# Patient Record
Sex: MALE | Race: WHITE | Employment: UNEMPLOYED | URBAN - METROPOLITAN AREA
[De-identification: names, ages, dates, MRNs, and addresses within clinical notes are randomized per-mention and may not be internally consistent; named-entity substitution may affect disease eponyms.]

---

## 2017-05-18 ENCOUNTER — APPOINTMENT (OUTPATIENT)
Dept: GENERAL RADIOLOGY | Age: 37
End: 2017-05-18
Payer: MEDICAID

## 2017-05-18 ENCOUNTER — APPOINTMENT (OUTPATIENT)
Dept: CT IMAGING | Age: 37
End: 2017-05-18
Payer: MEDICAID

## 2017-05-18 ENCOUNTER — HOSPITAL ENCOUNTER (EMERGENCY)
Age: 37
Discharge: HOME | End: 2017-05-18
Attending: EMERGENCY MEDICINE
Payer: MEDICAID

## 2017-05-18 VITALS
DIASTOLIC BLOOD PRESSURE: 87 MMHG | SYSTOLIC BLOOD PRESSURE: 149 MMHG | RESPIRATION RATE: 18 BRPM | HEART RATE: 108 BPM | TEMPERATURE: 98.5 F | BODY MASS INDEX: 26.36 KG/M2 | WEIGHT: 178 LBS | HEIGHT: 69 IN | OXYGEN SATURATION: 96 %

## 2017-05-18 DIAGNOSIS — M77.9 BONE SPUR: ICD-10-CM

## 2017-05-18 DIAGNOSIS — S00.83XA CONTUSION OF OTHER PART OF HEAD, INITIAL ENCOUNTER: Primary | ICD-10-CM

## 2017-05-18 PROCEDURE — 70450 CT HEAD/BRAIN W/O DYE: CPT

## 2017-05-18 PROCEDURE — 99284 EMERGENCY DEPT VISIT MOD MDM: CPT

## 2017-05-18 PROCEDURE — 73630 X-RAY EXAM OF FOOT: CPT

## 2017-05-18 RX ORDER — IBUPROFEN 600 MG/1
600 TABLET ORAL EVERY 8 HOURS PRN
Qty: 21 TABLET | Refills: 0 | Status: SHIPPED | OUTPATIENT
Start: 2017-05-18 | End: 2017-10-26 | Stop reason: ALTCHOICE

## 2017-05-18 ASSESSMENT — PAIN SCALES - GENERAL: PAINLEVEL_OUTOF10: 6

## 2017-05-18 ASSESSMENT — PAIN DESCRIPTION - ORIENTATION: ORIENTATION: LEFT

## 2017-05-18 ASSESSMENT — ENCOUNTER SYMPTOMS
RESPIRATORY NEGATIVE: 1
EYES NEGATIVE: 1
ALLERGIC/IMMUNOLOGIC NEGATIVE: 1

## 2017-05-18 ASSESSMENT — PAIN DESCRIPTION - DESCRIPTORS: DESCRIPTORS: ACHING;SHOOTING;SHARP

## 2017-05-18 ASSESSMENT — PAIN DESCRIPTION - LOCATION: LOCATION: LEG;HEAD

## 2017-05-18 ASSESSMENT — PAIN DESCRIPTION - FREQUENCY: FREQUENCY: CONTINUOUS

## 2017-09-28 ENCOUNTER — APPOINTMENT (OUTPATIENT)
Dept: GENERAL RADIOLOGY | Age: 37
DRG: 383 | End: 2017-09-28
Payer: MEDICARE

## 2017-09-28 ENCOUNTER — HOSPITAL ENCOUNTER (INPATIENT)
Age: 37
LOS: 2 days | Discharge: HOME | DRG: 383 | End: 2017-09-30
Attending: EMERGENCY MEDICINE | Admitting: PODIATRIST
Payer: MEDICARE

## 2017-09-28 DIAGNOSIS — L02.419 ABSCESS OF ANKLE: Primary | ICD-10-CM

## 2017-09-28 LAB
ABSOLUTE EOS #: 0.1 K/UL (ref 0–0.4)
ABSOLUTE LYMPH #: 2 K/UL (ref 1–4.8)
ABSOLUTE MONO #: 0.9 K/UL (ref 0.2–0.8)
ANION GAP SERPL CALCULATED.3IONS-SCNC: 14 MMOL/L (ref 9–17)
BASOPHILS # BLD: 0 %
BASOPHILS ABSOLUTE: 0 K/UL (ref 0–0.2)
BUN BLDV-MCNC: 6 MG/DL (ref 6–20)
BUN/CREAT BLD: 8 (ref 9–20)
C-REACTIVE PROTEIN: 32.5 MG/L (ref 0–5)
CALCIUM SERPL-MCNC: 8.7 MG/DL (ref 8.6–10.4)
CHLORIDE BLD-SCNC: 101 MMOL/L (ref 98–107)
CO2: 25 MMOL/L (ref 20–31)
CREAT SERPL-MCNC: 0.73 MG/DL (ref 0.7–1.2)
DIFFERENTIAL TYPE: ABNORMAL
EOSINOPHILS RELATIVE PERCENT: 1 %
GFR AFRICAN AMERICAN: >60 ML/MIN
GFR NON-AFRICAN AMERICAN: >60 ML/MIN
GFR SERPL CREATININE-BSD FRML MDRD: ABNORMAL ML/MIN/{1.73_M2}
GFR SERPL CREATININE-BSD FRML MDRD: ABNORMAL ML/MIN/{1.73_M2}
GLUCOSE BLD-MCNC: 100 MG/DL (ref 70–99)
HCT VFR BLD CALC: 45.4 % (ref 41–53)
HEMOGLOBIN: 15 G/DL (ref 13.5–17.5)
LYMPHOCYTES # BLD: 15 %
MCH RBC QN AUTO: 30.9 PG (ref 26–34)
MCHC RBC AUTO-ENTMCNC: 33.1 G/DL (ref 31–37)
MCV RBC AUTO: 93.5 FL (ref 80–100)
MONOCYTES # BLD: 7 %
PDW BLD-RTO: 13.2 % (ref 11.5–14.5)
PLATELET # BLD: 278 K/UL (ref 130–400)
PLATELET ESTIMATE: ABNORMAL
PMV BLD AUTO: 8 FL (ref 6–12)
POTASSIUM SERPL-SCNC: 3.8 MMOL/L (ref 3.7–5.3)
RBC # BLD: 4.86 M/UL (ref 4.5–5.9)
RBC # BLD: ABNORMAL 10*6/UL
SEDIMENTATION RATE, ERYTHROCYTE: 4 MM (ref 0–15)
SEG NEUTROPHILS: 77 %
SEGMENTED NEUTROPHILS ABSOLUTE COUNT: 10.3 K/UL (ref 1.8–7.7)
SODIUM BLD-SCNC: 140 MMOL/L (ref 135–144)
WBC # BLD: 13.3 K/UL (ref 3.5–11)
WBC # BLD: ABNORMAL 10*3/UL

## 2017-09-28 PROCEDURE — 6370000000 HC RX 637 (ALT 250 FOR IP): Performed by: PODIATRIST

## 2017-09-28 PROCEDURE — 99284 EMERGENCY DEPT VISIT MOD MDM: CPT

## 2017-09-28 PROCEDURE — 86140 C-REACTIVE PROTEIN: CPT

## 2017-09-28 PROCEDURE — 87075 CULTR BACTERIA EXCEPT BLOOD: CPT

## 2017-09-28 PROCEDURE — 85025 COMPLETE CBC W/AUTO DIFF WBC: CPT

## 2017-09-28 PROCEDURE — 0H9LXZX DRAINAGE OF LEFT LOWER LEG SKIN, EXTERNAL APPROACH, DIAGNOSTIC: ICD-10-PCS | Performed by: PODIATRIST

## 2017-09-28 PROCEDURE — 73610 X-RAY EXAM OF ANKLE: CPT

## 2017-09-28 PROCEDURE — 87186 SC STD MICRODIL/AGAR DIL: CPT

## 2017-09-28 PROCEDURE — 87070 CULTURE OTHR SPECIMN AEROBIC: CPT

## 2017-09-28 PROCEDURE — 86403 PARTICLE AGGLUT ANTBDY SCRN: CPT

## 2017-09-28 PROCEDURE — 85651 RBC SED RATE NONAUTOMATED: CPT

## 2017-09-28 PROCEDURE — 6370000000 HC RX 637 (ALT 250 FOR IP): Performed by: NURSE PRACTITIONER

## 2017-09-28 PROCEDURE — 2500000003 HC RX 250 WO HCPCS

## 2017-09-28 PROCEDURE — 80048 BASIC METABOLIC PNL TOTAL CA: CPT

## 2017-09-28 PROCEDURE — 87205 SMEAR GRAM STAIN: CPT

## 2017-09-28 PROCEDURE — 1200000000 HC SEMI PRIVATE

## 2017-09-28 PROCEDURE — 2500000003 HC RX 250 WO HCPCS: Performed by: PODIATRIST

## 2017-09-28 PROCEDURE — 6360000002 HC RX W HCPCS: Performed by: PODIATRIST

## 2017-09-28 RX ORDER — MORPHINE SULFATE 2 MG/ML
1 INJECTION, SOLUTION INTRAMUSCULAR; INTRAVENOUS
Status: DISCONTINUED | OUTPATIENT
Start: 2017-09-28 | End: 2017-09-30 | Stop reason: HOSPADM

## 2017-09-28 RX ORDER — SODIUM CHLORIDE 0.9 % (FLUSH) 0.9 %
10 SYRINGE (ML) INJECTION PRN
Status: DISCONTINUED | OUTPATIENT
Start: 2017-09-28 | End: 2017-09-30 | Stop reason: HOSPADM

## 2017-09-28 RX ORDER — MORPHINE SULFATE 2 MG/ML
2 INJECTION, SOLUTION INTRAMUSCULAR; INTRAVENOUS ONCE
Status: COMPLETED | OUTPATIENT
Start: 2017-09-28 | End: 2017-09-28

## 2017-09-28 RX ORDER — HYDROCODONE BITARTRATE AND ACETAMINOPHEN 5; 325 MG/1; MG/1
1 TABLET ORAL ONCE
Status: COMPLETED | OUTPATIENT
Start: 2017-09-28 | End: 2017-09-28

## 2017-09-28 RX ORDER — LIDOCAINE HYDROCHLORIDE 10 MG/ML
INJECTION, SOLUTION INFILTRATION; PERINEURAL
Status: COMPLETED
Start: 2017-09-28 | End: 2017-09-28

## 2017-09-28 RX ORDER — HYDROCODONE BITARTRATE AND ACETAMINOPHEN 5; 325 MG/1; MG/1
2 TABLET ORAL EVERY 4 HOURS PRN
Status: DISCONTINUED | OUTPATIENT
Start: 2017-09-28 | End: 2017-09-30 | Stop reason: HOSPADM

## 2017-09-28 RX ORDER — ACETAMINOPHEN 325 MG/1
650 TABLET ORAL EVERY 4 HOURS PRN
Status: DISCONTINUED | OUTPATIENT
Start: 2017-09-28 | End: 2017-09-30 | Stop reason: HOSPADM

## 2017-09-28 RX ORDER — LIDOCAINE HYDROCHLORIDE 10 MG/ML
20 INJECTION, SOLUTION INFILTRATION; PERINEURAL ONCE
Status: COMPLETED | OUTPATIENT
Start: 2017-09-28 | End: 2017-09-28

## 2017-09-28 RX ORDER — HYDROCODONE BITARTRATE AND ACETAMINOPHEN 5; 325 MG/1; MG/1
1 TABLET ORAL EVERY 4 HOURS PRN
Status: DISCONTINUED | OUTPATIENT
Start: 2017-09-28 | End: 2017-09-30 | Stop reason: HOSPADM

## 2017-09-28 RX ORDER — MORPHINE SULFATE 2 MG/ML
2 INJECTION, SOLUTION INTRAMUSCULAR; INTRAVENOUS
Status: DISCONTINUED | OUTPATIENT
Start: 2017-09-28 | End: 2017-09-30 | Stop reason: HOSPADM

## 2017-09-28 RX ORDER — ALBUTEROL SULFATE 90 UG/1
2 AEROSOL, METERED RESPIRATORY (INHALATION) EVERY 6 HOURS PRN
Status: DISCONTINUED | OUTPATIENT
Start: 2017-09-28 | End: 2017-09-30 | Stop reason: HOSPADM

## 2017-09-28 RX ORDER — SODIUM CHLORIDE 0.9 % (FLUSH) 0.9 %
10 SYRINGE (ML) INJECTION EVERY 12 HOURS SCHEDULED
Status: DISCONTINUED | OUTPATIENT
Start: 2017-09-28 | End: 2017-09-30 | Stop reason: HOSPADM

## 2017-09-28 RX ADMIN — MORPHINE SULFATE 2 MG: 2 INJECTION, SOLUTION INTRAMUSCULAR; INTRAVENOUS at 16:30

## 2017-09-28 RX ADMIN — TAZOBACTAM SODIUM AND PIPERACILLIN SODIUM 3.38 G: 375; 3 INJECTION, SOLUTION INTRAVENOUS at 20:38

## 2017-09-28 RX ADMIN — HYDROCODONE BITARTRATE AND ACETAMINOPHEN 1 TABLET: 5; 325 TABLET ORAL at 13:04

## 2017-09-28 RX ADMIN — HYDROCODONE BITARTRATE AND ACETAMINOPHEN 2 TABLET: 5; 325 TABLET ORAL at 21:05

## 2017-09-28 RX ADMIN — LIDOCAINE HYDROCHLORIDE 20 ML: 10 INJECTION, SOLUTION INFILTRATION; PERINEURAL at 15:16

## 2017-09-28 ASSESSMENT — PAIN SCALES - GENERAL
PAINLEVEL_OUTOF10: 10
PAINLEVEL_OUTOF10: 7
PAINLEVEL_OUTOF10: 4
PAINLEVEL_OUTOF10: 5
PAINLEVEL_OUTOF10: 7
PAINLEVEL_OUTOF10: 7
PAINLEVEL_OUTOF10: 4
PAINLEVEL_OUTOF10: 8

## 2017-09-28 ASSESSMENT — PAIN DESCRIPTION - FREQUENCY: FREQUENCY: CONTINUOUS

## 2017-09-28 ASSESSMENT — PAIN DESCRIPTION - ORIENTATION
ORIENTATION: LEFT
ORIENTATION: LEFT

## 2017-09-28 ASSESSMENT — PAIN DESCRIPTION - DESCRIPTORS
DESCRIPTORS: CONSTANT;THROBBING;SHARP
DESCRIPTORS: CONSTANT;THROBBING;SHARP

## 2017-09-28 ASSESSMENT — PAIN DESCRIPTION - LOCATION
LOCATION: ANKLE
LOCATION: ANKLE

## 2017-09-28 NOTE — Clinical Note
Patient Class: Inpatient [101]   REQUIRED: Diagnosis: Abscess of ankle [326002]   Estimated Length of Stay: Estimated stay of more than 2 midnights   Future Attending Provider: Kel Valles [[id number]]

## 2017-09-28 NOTE — ED PROVIDER NOTES
[address] [facility]  eMERGENCY dEPARTMENT eNCOUnter      Pt Name: Pina Duran  MRN: [id number]  [place] 1980  Date of evaluation: 9/28/2017  Provider: Mega Oliva NP    CHIEF COMPLAINT       Chief Complaint   Patient presents with    Abscess     left ankle, onset Last Monday, hit stones while mowing grass         HISTORY OF PRESENT ILLNESS  (Location/Symptom, Timing/Onset, Context/Setting, Quality, Duration, Modifying Factors, Severity.)   Pina Duran is a 40 y.o. male who presents to the emergency department  With complaints of left medial ankle pain. He states that 2 days ago he was using his mower and ran over a rock. The rocks up in caught him in the medial aspect of the left ankle. This produced some redness and swelling. He stayed at home it was applying ice. He awoke this morning he found a very red and swollen area that had become and more painful. He denies any fever or chills. Weightbearing increases the pain. He states he has taken nothing for the discomfort today. a friend brought him into the hospital today. Nursing Notes were reviewed. ALLERGIES     Review of patient's allergies indicates no known allergies.     CURRENT MEDICATIONS       Discharge Medication List as of 9/30/2017  4:39 PM      CONTINUE these medications which have NOT CHANGED    Details   ibuprofen (ADVIL;MOTRIN) 600 MG tablet Take 1 tablet by mouth every 8 hours as needed for Pain, Disp-21 tablet, R-0Print      albuterol (PROAIR HFA) 108 (90 BASE) MCG/ACT inhaler Inhale 2 puffs into the lungs every 6 hours as needed for Wheezing., Disp-1 Inhaler, R-0             PAST MEDICAL HISTORY         Diagnosis Date    Anxiety     epilepsy     Headache     hepatitis c     PTSD (post-traumatic stress disorder)     Transverse myelitis ([facility] [address]        SURGICAL HISTORY           Procedure Laterality Date    ARM SURGERY Right     DENTAL SURGERY      EYE SURGERY           FAMILY HISTORY           Problem Relation Age of Onset    Cancer Maternal Grandmother      Family Status   Relation Status    Maternal Grandmother         SOCIAL HISTORY      reports that he has been smoking Cigarettes. He has been smoking about 0.50 packs per day. He has never used smokeless tobacco. He reports that he drinks alcohol. He reports that he does not use illicit drugs. REVIEW OF SYSTEMS    (2-9 systems for level 4, 10 or more for level 5)   REVIEW OF SYSTEMS    Constitutional:  Denies fever,  complains of chills  Eyes:  Denies vision changes  HENT:  Denies sore throat or neck pain   Respiratory:  Denies cough or shortness of breath   Cardiovascular:  Denies chest pain  GI:  Denies abdominal pain, nausea, vomiting, or diarrhea   Musculoskeletal:  Complains of left ankle pain. Denies back pain  Skin:  Complains of erythema, swelling and abscessed left medial ankle      All systems negative except as marked. Except as noted above the remainder of the review of systems was reviewed and negative. PHYSICAL EXAM    (up to 7 for level 4, 8 or more for level 5)   ED Triage Vitals   BP Temp Temp Source Pulse Resp SpO2 Height Weight   09/28/17 1221 09/28/17 1221 09/28/17 1221 09/28/17 1221 09/28/17 1221 09/28/17 1221 09/28/17 1221 09/28/17 1221   121/60 99.4 °F (37.4 °C) Oral 99 16 98 % 5' 9\" (1.753 m) 179 lb 3.2 oz (81.3 kg)     General Appearance:  Alert, cooperative, no distress, appears stated age. Head:  Normocephalic, without obvious abnormality, atraumatic. Eyes:  conjunctiva/corneas clear, EOM's intact. Sclera anicteric. ENT: Mucous membranes moist.    Neck: Supple, symmetrical, trachea midline, no adenopathy. Lungs:   Respirations eupneic. Lungs CTA   Chest Wall:  Nontender   Heart:   Abdomen:     Extremities:              Pulses:   Skin:   Neurologic:    RRR  Soft, nontender    Erythema with swelling noted along the medial aspect left ankle.   There is a small abscess with mild fluctuance in the center of the erythema. The erythema extends outward nearly circumferential around the foot . the area is very tender to palpation and mildly warm. There is no drainage noted. Radial/ulnar pulses 3+    No rashes or lesions to exposed skin. Alert and oriented X 3. Motor grossly normal.  Speech clear. DIAGNOSTIC RESULTS       RADIOLOGY:   Non-plain film images such as CT, Ultrasound and MRI are read by the radiologist. Plain radiographic images are visualized and preliminarily interpreted by the emergency physician with the below findings:  Interpretation per the Radiologist below, if available at the time of this note:    MRI ANKLE LEFT [address]   Final Result   Small open wound within the soft tissues overlying the medial malleolus. Extensive surrounding soft tissue edema and enhancement, compatible with   cellulitis. No drainable fluid collection to suggest abscess formation. No   convincing evidence for underlying osteomyelitis. XR ANKLE LEFT (MIN 3 VIEWS)   Final Result   Medial ankle soft tissue swelling without acute fracture.                  LABS:  Labs Reviewed   ANAEROBIC AND AEROBIC CULTURE - Abnormal; Notable for the following:        Result Value    Direct Exam RARE GRAM POSITIVE COCCI SINGLY (*)     Culture   (*)     Value: METHICILLIN RESISTANT STAPHYLOCOCCUS AUREUS LIGHT GROWTH    Culture NO ANAEROBIC ORGANISMS ISOLATED AT 2 DAYS (*)     All other components within normal limits   C-REACTIVE PROTEIN - Abnormal; Notable for the following:     CRP 32.5 (*)     All other components within normal limits   CBC WITH AUTO DIFFERENTIAL - Abnormal; Notable for the following:     WBC 13.3 (*)     Segs Absolute 10.30 (*)     Absolute Mono # 0.90 (*)     All other components within normal limits   BASIC METABOLIC PANEL - Abnormal; Notable for the following:     Glucose 100 (*)     Bun/Cre Ratio 8 (*)     All other components within normal limits   BASIC METABOLIC PANEL - Abnormal; Notable for the following:     Glucose 112 (*)     Bun/Cre Ratio 7 (*)     All other components within normal limits   CBC WITH AUTO DIFFERENTIAL - Abnormal; Notable for the following:     WBC 13.5 (*)     Segs Absolute 9.50 (*)     Absolute Mono # 1.20 (*)     All other components within normal limits   VANCOMYCIN, TROUGH - Abnormal; Notable for the following:     Vancomycin Tr 8.0 (*)     All other components within normal limits   SEDIMENTATION RATE       All other labs were within normal range or not returned as of this dictation. EMERGENCY DEPARTMENT COURSE and DIFFERENTIAL DIAGNOSIS/MDM:   Vitals:    Vitals:    17 0126 17 0512 17 0809 17 1213   BP: 108/63 106/69 118/68 120/71   Pulse: 65 73 68 71   Resp:  18 20 20   Temp:  97.5 °F (36.4 °C) 98.1 °F (36.7 °C) 98 °F (36.7 °C)   TempSrc:  Oral Oral Oral   SpO2:  93% 97% 97%   Weight:       Height:           Medical Decision Makin-year-old male with a large abscessed area of the left medial ankle there is also cellulitic. X-ray was negative for any bony injury. Lab review: mild leukocytosis is appreciated. Podiatry was consulted and evaluated the patient in the emergency department. .  The patient be admitted to the hospital for further care and intervention. CONSULTS:  IP CONSULT TO HOSPITALIST  IP CONSULT TO SOCIAL WORK  IP CONSULT TO PHARMACY  IP CONSULT TO INFECTIOUS DISEASES    PROCEDURES:  None    FINAL IMPRESSION      1.  Abscess of ankle          DISPOSITION/PLAN   DISPOSITION Decision to Admit    PATIENT REFERRED TO:   Bao Steven, [address]  [phone]    In 1 week  and find new pcp and follow up      DISCHARGE MEDICATIONS:     Discharge Medication List as of 2017  4:39 PM      START taking these medications    Details   sulfamethoxazole-trimethoprim (BACTRIM DS) 800-160 MG per tablet Take 1 tablet by mouth 2 times daily for 10 days, Disp-20 tablet, R-0Print HYDROcodone-acetaminophen (NORCO) 5-325 MG per tablet Take 1 tablet by mouth every 6 hours as needed for Pain ., Disp-20 tablet, R-0Print                 (Please note that portions of this note were completed with a voice recognition program.  Efforts were made to edit the dictations but occasionally words are mis-transcribed.)         Cynthia Poe NP  10/01/17 3466

## 2017-09-28 NOTE — H&P
Podiatry H&P      HPI:     Catarino Hu is a 40 y.o. male who presents to the hospital complaining of left ankle pain. Patient states on Monday of this week he was mowing the lawn and a piece of debris flew off the mower and hit his left ankle. He states yesterday the ankle started to form an abscess and become pain. This prompted him to come to the ER today. He reports a history of IVDA, Hep C, bipolar, but states he has not seen a doctor and is off of medication. Denies n/v/f/c. Past Medical History   has a past medical history of Anxiety; Headache; PTSD (post-traumatic stress disorder); and Transverse myelitis ([facility] [address] Past Surgical History   has a past surgical history that includes eye surgery; Dental surgery; and Arm Surgery (Right). Medications  Prior to Admission medications    Medication Sig Start Date End Date Taking? Authorizing Provider   ibuprofen (ADVIL;MOTRIN) 600 MG tablet Take 1 tablet by mouth every 8 hours as needed for Pain 5/18/17   Thao Fernandez MD   albuterol (PROAIR HFA) 108 (90 BASE) MCG/ACT inhaler Inhale 2 puffs into the lungs every 6 hours as needed for Wheezing. 3/1/15   Loretta Wu MD    Scheduled Meds:   lidocaine  20 mL Intradermal Once    lidocaine         Continuous Infusions:   PRN Meds:. Allergies  has No Known Allergies. Family History  family history is not on file. Social History   reports that he has been smoking Cigarettes. He has been smoking about 0.50 packs per day. He has never used smokeless tobacco.   reports that he drinks alcohol. reports that he does not use illicit drugs. Review of Systems  All systems reviewed as negative unless otherwise stated in HPI    PHYSICAL EXAM:     Vitals:   Vitals:    09/28/17 1221   BP: 121/60   Pulse: 99   Resp: 16   Temp: 99.4 °F (37.4 °C)   SpO2: 98%     General: AAO x 3 in NAD.   Heart: RRR  Lungs: CTA b/l  Abd: Soft NT/ND    Lower Extremity Physical Exam:    Vascular: DP/PT pulses are palpable 2/4, Bilateral.  CFT <3 seconds to all digits. Nonpitting edema present, Left medial ankle    Neurological: Protective sensation intact to 10/10 sites as tested with [place] Monofilament 5.07, Bilateral.  Sharp/dull discrimination intact,Bilateral .    Musculoskeletal: Muscle strength 5/5 for all lower extremity muscle groups, Bilateral.  Pain present upon palpation of medial left ankle. Pt able to perform active/passive ROM of L ankle without limitation. Dermatologic: Fluctuant abscess over medial malleolus of left ankle with erythema and calor. No crepitus. No wound over abscess. No proximal streaking. Labs:  Lab Results   Component Value Date    WBC 13.3 (H) 09/28/2017    HGB 15.0 09/28/2017    HCT 45.4 09/28/2017     09/28/2017    LYMPHOPCT 15 09/28/2017    MONOPCT 7 09/28/2017    BASOPCT 0 09/28/2017     Lab Results   Component Value Date     09/28/2017    K 3.8 09/28/2017     09/28/2017    CO2 25 09/28/2017    BUN 6 09/28/2017    CREATININE 0.73 09/28/2017    GLUCOSE 100 (H) 09/28/2017    CALCIUM 8.7 09/28/2017       Radiographs:   L ankle xray 9/28/17:  FINDINGS:   Large amount of soft tissue swelling is seen medially.  No acute fracture. No widening of the ankle mortise.  Joint spaces are preserved.           Impression   Medial ankle soft tissue swelling without acute fracture. Procedure note: I&D Left ankle abscess    Verbal consent obtained. Time out performed with RN present. Skin cleansed with betadine to Left medial ankle. Anesthesia with 5 cc 1% lidocaine plain. #15 blade used to make stab incision over abscess. Approximately 3 cc of purulent drainage expressed. Aerobic/anarobics cultures obtained. Site irrigated with NS. Packed with 1/4 iodoform packing and dressed with 4x4 gauze, abd, kerlix and Ace bandage. Hemostasis with direct pressure. Patient tolerated well.     ASSESSMENT:     Patient is a 40 y.o. male with abscess of Left ankle    Patient Active Problem List   Diagnosis    RML pneumonia    PTSD (post-traumatic stress disorder)    Chronic pain       PLAN:     -Patient examined and evaluated. -Diagnosis and treatment options discussed in detail.  -Procedure as above (Left ankle abscess I&D)  -1/4 inch iodoform and DSD To Left ankle  -Aerobic/anaerobic cultures  -Xrays reviewed  -Admit for IV abx   -IV vanc (pharmacy to dose)   -IV zosyn  -NWB LLE, up as tolerated  -Surgical shoe to left foot  -Diet: general  -Pain control: Norco/Morphine pain panel - must take PO medications first  -DVT ppx: lovenox  -Consult: IM, ID  -PT/OT eval and treat  -SW discharge planning  -Discussed with attending.       Electronically signed by Sveta Zelaya DPM  on 9/28/2017 at 3:41 PM

## 2017-09-28 NOTE — ED PROVIDER NOTES
The patient was seen and examined by me in conjunction with the mid-level provider. I agree with his/her assessment and treatment plan. The patient has been seen by podiatry here and the abscess has been opened and drained. He is being admitted for IV antibiotic.      Benetta Riedel, MD  09/28/17 7770

## 2017-09-28 NOTE — IP AVS SNAPSHOT
After Visit Summary  (Discharge Instructions)    Medication List for Home    Based on the information you provided to us as well as any changes during this visit, the following is your updated medication list.  Compare this with your prescription bottles at home. If you have any questions or concerns, contact your primary care physician's office. Daily Medication List (This medication list can be shared with any healthcare provider who is helping you manage your medications)      There are NEW medications for you. START taking them after you leave the hospital        Last Dose    Next Dose Due AM NOON PM NIGHT    HYDROcodone-acetaminophen 5-325 MG per tablet   Commonly known as:  NORCO   Take 1 tablet by mouth every 6 hours as needed for Pain . 2 tablets on 9/30/2017  3:32 PM                            sulfamethoxazole-trimethoprim 800-160 MG per tablet   Commonly known as:  BACTRIM DS   Take 1 tablet by mouth 2 times daily for 10 days                                           These are medications you told us you were taking at home, CONTINUE taking them after you leave the hospital        Last Dose    Next Dose Due AM NOON PM NIGHT    albuterol sulfate  (90 Base) MCG/ACT inhaler   Commonly known as:  PROAIR HFA   Inhale 2 puffs into the lungs every 6 hours as needed for Wheezing.                                          clonazePAM 1 MG tablet   Commonly known as:  KLONOPIN   Take 1 tablet by mouth 2 times daily                1 mg on 9/30/2017  9:29 AM                            ibuprofen 600 MG tablet   Commonly known as:  ADVIL;MOTRIN   Take 1 tablet by mouth every 8 hours as needed for Pain                                         lamoTRIgine 25 MG tablet   Commonly known as:  LAMICTAL   Take 2 tablets by mouth 2 times daily                50 mg on 9/30/2017  9:29 AM                            QUEtiapine 50 MG tablet   Commonly known as:  SEROQUEL Take 1 tablet by mouth nightly                50 mg on 9/29/2017  9:26 PM                              ASK your doctor about these medications if you have questions        Last Dose    Next Dose Due AM NOON PM NIGHT    ALPRAZolam 1 MG tablet   Commonly known as:  XANAX   Take 1 mg by mouth 3 times daily. gabapentin 300 MG capsule   Commonly known as:  NEURONTIN   Take 600 mg by mouth 3 times daily. ROXICODONE 5 MG immediate release tablet   Generic drug:  oxyCODONE   Take 15 mg by mouth 2 times daily. Where to Get Your Medications      You can get these medications from any pharmacy     Bring a paper prescription for each of these medications     clonazePAM 1 MG tablet    HYDROcodone-acetaminophen 5-325 MG per tablet    lamoTRIgine 25 MG tablet    QUEtiapine 50 MG tablet    sulfamethoxazole-trimethoprim 800-160 MG per tablet               Allergies as of 9/30/2017     No Known Allergies      Immunizations as of 9/30/2017     No immunizations on file. Last Vitals          Most Recent Value    Temp  98 °F (36.7 °C)    Pulse  71    Resp  20    BP  120/71         After Visit Summary    This summary was created for you. Thank you for entrusting your care to us. The following information includes details about your hospital/visit stay along with steps you should take to help with your recovery once you leave the hospital.  In this packet, you will find information about the topics listed below:    · Instructions about your medications including a list of your home medications  · A summary of your hospital visit  · Follow-up appointments once you have left the hospital  · Your care plan at home      You may receive a survey regarding the care you received during your stay. Your input is valuable to us. We encourage you to complete and return your survey in the envelope provided. We hope you will choose us in the future for your healthcare needs. Patient Information     Patient Name JESSEE Salazar 1980      Care Provided at:     Name Address Phone       [address] Altman[address] [facility] [address] [place] [address] [phone]            Your Visit    Here you will find information about your visit, including the reason for your visit. Please take this sheet with you when you visit your doctor or other health care provider in the future. It will help determine the best possible medical care for you at that time. If you have any questions once you leave the hospital, please call the department phone number listed below. Why you were here     Your primary diagnosis was:  Abscess Of Left Leg    Your diagnoses also included:  Idiopathic Generalized Epilepsy (Hcc), Tobacco Abuse      Visit Information     Date & Time Provider Department Dept. Phone    2017 Jabier Paredes MD [facility] [phone]       Follow-up Appointments    Below is a list of your follow-up and future appointments. This may not be a complete list as you may have made appointments directly with providers that we are not aware of or your providers may have made some for you. Please call your providers to confirm appointments. It is important to keep your appointments. Please bring your current insurance card, photo ID, co-pay, and all medication bottles to your appointment. If self-pay, payment is expected at the time of service. Follow-up Information     Follow up with Jabier Paredes MD In 1 week. Specialty:  Podiatry    Why:  and find new pcp and follow up    Contact information:    [address] [facility]  [phone]        Preventive Care        Date Due    HIV screening is recommended for all people regardless of risk factors  aged 15-65 years at least once (lifetime) who have never been HIV tested.  1995 Tetanus Combination Vaccine (1 - Tdap) 2/20/1999    Pneumococcal Vaccine - Pneumovax for adults aged 19-64 years with: chronic heart disease, chronic lung disease, diabetes mellitus, alcoholism, chronic liver disease, or cigarette smoking. (1 of 1 - PPSV23) 2/20/1999    Yearly Flu Vaccine (1) 9/1/2017                 Care Plan Once You Return Home    This section includes instructions you will need to follow once you leave the hospital.  Your care team will discuss these with you, so you and those caring for you know how to best care for your health needs at home. This section may also include educational information about certain health topics that may be of help to you. Discharge Instructions       Apply dry sterile dressing daily with 4x4s, kerlix and an ace bandage. Follow up within one week. Diet Instructions     ? Good nutrition is important when healing from an illness, injury, or surgery. Follow any nutrition recommendations given to you during your hospital stay. ? If you were given an oral nutrition supplement while in the hospital, continue to take this supplement at home. You can take it with meals, in-between meals, and/or before bedtime. These supplements can be purchased at most local grocery stores, pharmacies, and [employer]. ? If you have any questions about your diet or nutrition, call the hospital and ask for the dietitian. Regular diet           Activity Instructions     No weight bearing left leg, use crutches           Important information for a smoker       SMOKING: QUIT SMOKING. THIS IS THE MOST IMPORTANT ACTION YOU CAN TAKE TO IMPROVE YOUR CURRENT AND FUTURE HEALTH. Call the [address] [place] [address] at [facility] ([phone])    Smoking harms nonsmokers. When nonsmokers are around people who smoke, they absorb nicotine, carbon monoxide, and other ingredients of tobacco smoke.      DO NOT SMOKE AROUND CHILDREN Children exposed to secondhand smoke are at an increased risk of:  Sudden Infant Death Syndrome (SIDS), acute respiratory infections, inflammation of the middle ear, and severe asthma. Over a longer time, it causes heart disease and lung cancer. There is no safe level of exposure to secondhand smoke. [employer] Signup     [employer] allows you to send messages to your doctor, view your test results, renew your prescriptions, schedule appointments, view visit notes, and more. How Do I Sign Up? 1. In your Internet browser, go to [email or web address]  2. Click on the Sign Up Now link in the Sign In box. You will see the New Member Sign Up page. 3. Enter your [employer] Access Code exactly as it appears below. You will not need to use this code after youve completed the sign-up process. If you do not sign up before the expiration date, you must request a new code. [employer] Access Code: [email or web address]  Expires: 11/29/2017  4:39 PM    4. Enter your Social Security Number ([id number]) and Date of Birth (mm/dd/yyyy) as indicated and click Submit. You will be taken to the next sign-up page. 5. Create a Play[employer] ID. This will be your [employer] login ID and cannot be changed, so think of one that is secure and easy to remember. 6. Create a [employer] password. You can change your password at any time. 7. Enter your Password Reset Question and Answer. This can be used at a later time if you forget your password. 8. Enter your e-mail address. You will receive e-mail notification when new information is available in [address]. 9. Click Sign Up. You can now view your medical record. Additional Information  If you have questions, please contact the physician practice where you receive care. Remember, [employer] is NOT to be used for urgent needs. For medical emergencies, dial 911. For questions regarding your [employer] account call [phone].  If you have a clinical question, please call your doctor's office. View your information online  ? Review your current list of  medications, immunization, and allergies. ? Review your future test results online . ? Review your discharge instructions provided by your caregivers at discharge    Certain functionality such as prescription refills, scheduling appointments or sending messages to your provider are not activated if your provider does not use [employer] in his/her office    For questions regarding your MyChart account call [phone]. If you have a clinical question, please call your doctor's office. The information on all pages of the After Visit Summary has been reviewed with me, the patient and/or responsible adult, by my health care provider(s). I had the opportunity to ask questions regarding this information. I understand I should dispose of my armband safely at home to protect my health information. A complete copy of the After Visit Summary has been given to me, the patient and/or responsible adult.            Patient Signature/Responsible Adult:____________________    Clinician Signature:_____________________    Date:_____________________    Time:_____________________

## 2017-09-29 PROBLEM — Z72.0 TOBACCO ABUSE: Status: ACTIVE | Noted: 2017-09-29

## 2017-09-29 PROBLEM — G40.309 IDIOPATHIC GENERALIZED EPILEPSY (HCC): Status: ACTIVE | Noted: 2017-09-29

## 2017-09-29 PROBLEM — L02.416 ABSCESS OF LEFT LEG: Status: ACTIVE | Noted: 2017-09-29

## 2017-09-29 LAB
ABSOLUTE EOS #: 0.2 K/UL (ref 0–0.4)
ABSOLUTE LYMPH #: 2.5 K/UL (ref 1–4.8)
ABSOLUTE MONO #: 1.2 K/UL (ref 0.2–0.8)
ANION GAP SERPL CALCULATED.3IONS-SCNC: 16 MMOL/L (ref 9–17)
BASOPHILS # BLD: 1 %
BASOPHILS ABSOLUTE: 0.1 K/UL (ref 0–0.2)
BUN BLDV-MCNC: 6 MG/DL (ref 6–20)
BUN/CREAT BLD: 7 (ref 9–20)
CALCIUM SERPL-MCNC: 8.7 MG/DL (ref 8.6–10.4)
CHLORIDE BLD-SCNC: 98 MMOL/L (ref 98–107)
CO2: 21 MMOL/L (ref 20–31)
CREAT SERPL-MCNC: 0.81 MG/DL (ref 0.7–1.2)
DIFFERENTIAL TYPE: ABNORMAL
EOSINOPHILS RELATIVE PERCENT: 1 %
GFR AFRICAN AMERICAN: >60 ML/MIN
GFR NON-AFRICAN AMERICAN: >60 ML/MIN
GFR SERPL CREATININE-BSD FRML MDRD: ABNORMAL ML/MIN/{1.73_M2}
GFR SERPL CREATININE-BSD FRML MDRD: ABNORMAL ML/MIN/{1.73_M2}
GLUCOSE BLD-MCNC: 112 MG/DL (ref 70–99)
HCT VFR BLD CALC: 44.4 % (ref 41–53)
HEMOGLOBIN: 15.1 G/DL (ref 13.5–17.5)
LYMPHOCYTES # BLD: 19 %
MCH RBC QN AUTO: 31.6 PG (ref 26–34)
MCHC RBC AUTO-ENTMCNC: 34 G/DL (ref 31–37)
MCV RBC AUTO: 93.1 FL (ref 80–100)
MONOCYTES # BLD: 9 %
PDW BLD-RTO: 13.2 % (ref 11.5–14.5)
PLATELET # BLD: 267 K/UL (ref 130–400)
PLATELET ESTIMATE: ABNORMAL
PMV BLD AUTO: 7.8 FL (ref 6–12)
POTASSIUM SERPL-SCNC: 3.7 MMOL/L (ref 3.7–5.3)
RBC # BLD: 4.77 M/UL (ref 4.5–5.9)
RBC # BLD: ABNORMAL 10*6/UL
SEG NEUTROPHILS: 70 %
SEGMENTED NEUTROPHILS ABSOLUTE COUNT: 9.5 K/UL (ref 1.8–7.7)
SODIUM BLD-SCNC: 135 MMOL/L (ref 135–144)
WBC # BLD: 13.5 K/UL (ref 3.5–11)
WBC # BLD: ABNORMAL 10*3/UL

## 2017-09-29 PROCEDURE — 6360000002 HC RX W HCPCS: Performed by: PODIATRIST

## 2017-09-29 PROCEDURE — 2580000003 HC RX 258: Performed by: PODIATRIST

## 2017-09-29 PROCEDURE — 97116 GAIT TRAINING THERAPY: CPT

## 2017-09-29 PROCEDURE — G8978 MOBILITY CURRENT STATUS: HCPCS

## 2017-09-29 PROCEDURE — 80048 BASIC METABOLIC PNL TOTAL CA: CPT

## 2017-09-29 PROCEDURE — 85025 COMPLETE CBC W/AUTO DIFF WBC: CPT

## 2017-09-29 PROCEDURE — 36415 COLL VENOUS BLD VENIPUNCTURE: CPT

## 2017-09-29 PROCEDURE — 6370000000 HC RX 637 (ALT 250 FOR IP): Performed by: INTERNAL MEDICINE

## 2017-09-29 PROCEDURE — 97530 THERAPEUTIC ACTIVITIES: CPT

## 2017-09-29 PROCEDURE — 99252 IP/OBS CONSLTJ NEW/EST SF 35: CPT | Performed by: INTERNAL MEDICINE

## 2017-09-29 PROCEDURE — 2500000003 HC RX 250 WO HCPCS: Performed by: PODIATRIST

## 2017-09-29 PROCEDURE — 97166 OT EVAL MOD COMPLEX 45 MIN: CPT

## 2017-09-29 PROCEDURE — 97535 SELF CARE MNGMENT TRAINING: CPT

## 2017-09-29 PROCEDURE — 1200000000 HC SEMI PRIVATE

## 2017-09-29 PROCEDURE — 6370000000 HC RX 637 (ALT 250 FOR IP): Performed by: PODIATRIST

## 2017-09-29 PROCEDURE — 97161 PT EVAL LOW COMPLEX 20 MIN: CPT

## 2017-09-29 PROCEDURE — G8979 MOBILITY GOAL STATUS: HCPCS

## 2017-09-29 RX ORDER — QUETIAPINE FUMARATE 25 MG/1
50 TABLET, FILM COATED ORAL NIGHTLY
Status: DISCONTINUED | OUTPATIENT
Start: 2017-09-29 | End: 2017-09-30 | Stop reason: HOSPADM

## 2017-09-29 RX ORDER — LAMOTRIGINE 25 MG/1
50 TABLET ORAL 2 TIMES DAILY
Status: DISCONTINUED | OUTPATIENT
Start: 2017-09-29 | End: 2017-09-30 | Stop reason: HOSPADM

## 2017-09-29 RX ORDER — CLONAZEPAM 0.5 MG/1
1 TABLET ORAL 2 TIMES DAILY
Status: DISCONTINUED | OUTPATIENT
Start: 2017-09-29 | End: 2017-09-30 | Stop reason: HOSPADM

## 2017-09-29 RX ORDER — QUETIAPINE FUMARATE 50 MG/1
50 TABLET, FILM COATED ORAL NIGHTLY
Status: ON HOLD | COMMUNITY
End: 2017-09-30

## 2017-09-29 RX ORDER — LAMOTRIGINE 25 MG/1
50 TABLET ORAL 2 TIMES DAILY
Status: ON HOLD | COMMUNITY
End: 2017-09-30

## 2017-09-29 RX ORDER — CLONAZEPAM 1 MG/1
1 TABLET ORAL 2 TIMES DAILY
Status: ON HOLD | COMMUNITY
End: 2017-09-30

## 2017-09-29 RX ADMIN — CLONAZEPAM 1 MG: 0.5 TABLET ORAL at 21:26

## 2017-09-29 RX ADMIN — VANCOMYCIN HYDROCHLORIDE 1250 MG: 1 INJECTION, POWDER, LYOPHILIZED, FOR SOLUTION INTRAVENOUS at 23:54

## 2017-09-29 RX ADMIN — MORPHINE SULFATE 2 MG: 2 INJECTION, SOLUTION INTRAMUSCULAR; INTRAVENOUS at 00:21

## 2017-09-29 RX ADMIN — MORPHINE SULFATE 2 MG: 2 INJECTION, SOLUTION INTRAMUSCULAR; INTRAVENOUS at 05:15

## 2017-09-29 RX ADMIN — VANCOMYCIN HYDROCHLORIDE 1250 MG: 1 INJECTION, POWDER, LYOPHILIZED, FOR SOLUTION INTRAVENOUS at 14:33

## 2017-09-29 RX ADMIN — HYDROCODONE BITARTRATE AND ACETAMINOPHEN 2 TABLET: 5; 325 TABLET ORAL at 15:32

## 2017-09-29 RX ADMIN — LAMOTRIGINE 50 MG: 25 TABLET ORAL at 21:26

## 2017-09-29 RX ADMIN — MORPHINE SULFATE 1 MG: 2 INJECTION, SOLUTION INTRAMUSCULAR; INTRAVENOUS at 19:10

## 2017-09-29 RX ADMIN — VANCOMYCIN HYDROCHLORIDE 1250 MG: 1 INJECTION, POWDER, LYOPHILIZED, FOR SOLUTION INTRAVENOUS at 00:48

## 2017-09-29 RX ADMIN — MORPHINE SULFATE 2 MG: 2 INJECTION, SOLUTION INTRAMUSCULAR; INTRAVENOUS at 10:57

## 2017-09-29 RX ADMIN — HYDROCODONE BITARTRATE AND ACETAMINOPHEN 2 TABLET: 5; 325 TABLET ORAL at 21:24

## 2017-09-29 RX ADMIN — TAZOBACTAM SODIUM AND PIPERACILLIN SODIUM 3.38 G: 375; 3 INJECTION, SOLUTION INTRAVENOUS at 17:26

## 2017-09-29 RX ADMIN — Medication 10 ML: at 19:11

## 2017-09-29 RX ADMIN — ENOXAPARIN SODIUM 40 MG: 40 INJECTION SUBCUTANEOUS at 08:11

## 2017-09-29 RX ADMIN — HYDROCODONE BITARTRATE AND ACETAMINOPHEN 2 TABLET: 5; 325 TABLET ORAL at 02:15

## 2017-09-29 RX ADMIN — TAZOBACTAM SODIUM AND PIPERACILLIN SODIUM 3.38 G: 375; 3 INJECTION, SOLUTION INTRAVENOUS at 05:00

## 2017-09-29 RX ADMIN — HYDROCODONE BITARTRATE AND ACETAMINOPHEN 2 TABLET: 5; 325 TABLET ORAL at 08:09

## 2017-09-29 RX ADMIN — QUETIAPINE FUMARATE 50 MG: 25 TABLET, FILM COATED ORAL at 21:26

## 2017-09-29 ASSESSMENT — PAIN SCALES - GENERAL
PAINLEVEL_OUTOF10: 6
PAINLEVEL_OUTOF10: 6
PAINLEVEL_OUTOF10: 3
PAINLEVEL_OUTOF10: 7
PAINLEVEL_OUTOF10: 10
PAINLEVEL_OUTOF10: 7
PAINLEVEL_OUTOF10: 7

## 2017-09-29 ASSESSMENT — PAIN DESCRIPTION - LOCATION
LOCATION: ANKLE
LOCATION: ANKLE

## 2017-09-29 ASSESSMENT — PAIN DESCRIPTION - FREQUENCY: FREQUENCY: CONTINUOUS

## 2017-09-29 ASSESSMENT — PAIN DESCRIPTION - ORIENTATION
ORIENTATION: LEFT
ORIENTATION: LEFT

## 2017-09-29 ASSESSMENT — PAIN DESCRIPTION - PAIN TYPE: TYPE: ACUTE PAIN

## 2017-09-29 ASSESSMENT — PAIN DESCRIPTION - DESCRIPTORS: DESCRIPTORS: ACHING

## 2017-09-29 NOTE — CONSULTS
or output data in the 24 hours ending 09/29/17 1204    General Appearance:  alert, well appearing, and in no acute distress  Mental status: oriented to person, place, and time with normal affect  Head:  normocephalic, atraumatic. Eye: no icterus, redness, pupils equal and reactive, extraocular eye movements intact, conjunctiva clear  Ear: normal external ear, no discharge, hearing intact  Nose:  no drainage noted  Mouth: mucous membranes moist  Neck: supple, no carotid bruits, thyroid not palpable  Lungs: Bilateral equal air entry, clear to ausculation, no wheezing, rales or rhonchi, normal effort  Cardiovascular: normal rate, regular rhythm, no murmur, gallop, rub.   Abdomen: Soft, nontender, nondistended, normal bowel sounds, no hepatomegaly or splenomegaly  Neurologic: There are no new focal motor or sensory deficits, normal muscle tone and bulk, no abnormal sensation, normal speech, cranial nerves II through XII grossly intact  Skin: No rashes, bruising or bleeding on exposed skin area; left ankle bandaged  Extremities:  peripheral pulses palpable, no calf pain with palpation  Psych: normal affect     Osteopathic Examination: Unable to perform due to patients current medical condition    Investigations:      Laboratory Testing:  Recent Results (from the past 24 hour(s))   Sedimentation Rate    Collection Time: 09/28/17  3:04 PM   Result Value Ref Range    Sed Rate 4 0 - 15 mm   C-Reactive Protein    Collection Time: 09/28/17  3:04 PM   Result Value Ref Range    CRP 32.5 (H) 0.0 - 5.0 mg/L   CBC with DIFF    Collection Time: 09/28/17  3:04 PM   Result Value Ref Range    WBC 13.3 (H) 3.5 - 11.0 k/uL    RBC 4.86 4.5 - 5.9 m/uL    Hemoglobin 15.0 13.5 - 17.5 g/dL    Hematocrit 45.4 41 - 53 %    MCV 93.5 80 - 100 fL    MCH 30.9 26 - 34 pg    MCHC 33.1 31 - 37 g/dL    RDW 13.2 11.5 - 14.5 %    Platelets 228 508 - 492 k/uL    MPV 8.0 6.0 - 12.0 fL    Differential Type NOT REPORTED     Seg Neutrophils 77 % Lymphocytes 15 %    Monocytes 7 %    Eosinophils % 1 %    Basophils 0 %    Segs Absolute 10.30 (H) 1.8 - 7.7 k/uL    Absolute Lymph # 2.00 1.0 - 4.8 k/uL    Absolute Mono # 0.90 (H) 0.2 - 0.8 k/uL    Absolute Eos # 0.10 0.0 - 0.4 k/uL    Basophils # 0.00 0.0 - 0.2 k/uL    WBC Morphology NOT REPORTED     RBC Morphology NOT REPORTED     Platelet Estimate NOT REPORTED    Basic Metabolic Prof    Collection Time: 09/28/17  3:04 PM   Result Value Ref Range    Glucose 100 (H) 70 - 99 mg/dL    BUN 6 6 - 20 mg/dL    CREATININE 0.73 0.70 - 1.20 mg/dL    Bun/Cre Ratio 8 (L) 9 - 20    Calcium 8.7 8.6 - 10.4 mg/dL    Sodium 140 135 - 144 mmol/L    Potassium 3.8 3.7 - 5.3 mmol/L    Chloride 101 98 - 107 mmol/L    CO2 25 20 - 31 mmol/L    Anion Gap 14 9 - 17 mmol/L    GFR Non-African American >60 >60 mL/min    GFR African American >60 >60 mL/min    GFR Comment          GFR Staging NOT REPORTED    Cult,Aerobe/Anaerobe    Collection Time: 09/28/17  3:39 PM   Result Value Ref Range    Specimen Description       . FOOT LEFT ANKLE Performed at [address] [place], [place] [id number] [facility]    Specimen Description  [place], [address] [facility] [phone]     Special Requests NOT REPORTED     Direct Exam NO NEUTROPHILS SEEN     Direct Exam RARE GRAM POSITIVE COCCI SINGLY (A)     Direct Exam       Performed at [facility] [address] [facility], [address] [place] [address] [phone]    Culture Pending     Status Pending    Basic metabolic panel    Collection Time: 09/29/17 10:45 AM   Result Value Ref Range    Glucose 112 (H) 70 - 99 mg/dL    BUN 6 6 - 20 mg/dL    CREATININE 0.81 0.70 - 1.20 mg/dL    Bun/Cre Ratio 7 (L) 9 - 20    Calcium 8.7 8.6 - 10.4 mg/dL    Sodium 135 135 - 144 mmol/L    Potassium 3.7 3.7 - 5.3 mmol/L    Chloride 98 98 - 107 mmol/L    CO2 21 20 - 31 mmol/L    Anion Gap 16 9 - 17 mmol/L    GFR Non-African American >60 >60 mL/min    GFR African American >60 >60 mL/min    GFR Comment          GFR Staging NOT REPORTED    CBC auto differential    Collection Time: 09/29/17 10:45 AM   Result Value Ref Range    WBC 13.5 (H) 3.5 - 11.0 k/uL    RBC 4.77 4.5 - 5.9 m/uL    Hemoglobin 15.1 13.5 - 17.5 g/dL    Hematocrit 44.4 41 - 53 %    MCV 93.1 80 - 100 fL    MCH 31.6 26 - 34 pg    MCHC 34.0 31 - 37 g/dL    RDW 13.2 11.5 - 14.5 %    Platelets 356 858 - 589 k/uL    MPV 7.8 6.0 - 12.0 fL    Differential Type NOT REPORTED     Seg Neutrophils 70 %    Lymphocytes 19 %    Monocytes 9 %    Eosinophils % 1 %    Basophils 1 %    Segs Absolute 9.50 (H) 1.8 - 7.7 k/uL    Absolute Lymph # 2.50 1.0 - 4.8 k/uL    Absolute Mono # 1.20 (H) 0.2 - 0.8 k/uL    Absolute Eos # 0.20 0.0 - 0.4 k/uL    Basophils # 0.10 0.0 - 0.2 k/uL    WBC Morphology NOT REPORTED     RBC Morphology NOT REPORTED     Platelet Estimate NOT REPORTED        Imaging/Diagonstics: Ankle xray     Impression   Medial ankle soft tissue swelling without acute fracture. Assessment :      Primary Problem  Left ankle abscess    Active Hospital Problems    Diagnosis Date Noted    [facility] [G40.309] 09/29/2017    Tobacco abuse [Z72.0] 09/29/2017    PTSD (post-traumatic stress disorder) [F43.10]        Plan:     1. Resume lamictal/klonopin/seroquel at previous doses; will give script for 1 month supply also and then he needs to f/u with new pcp and psychiatry  2. Get pt a list of pcps  3. Smoking cessation    Consultations:   IP CONSULT TO HOSPITALIST  IP CONSULT TO SOCIAL WORK  IP CONSULT TO PHARMACY  IP CONSULT TO INFECTIOUS DISEASES      Carol Holguin Blood, DO  9/29/2017  12:04 PM    Copy sent to Dr. Deborah Newby primary care provider on file.

## 2017-09-29 NOTE — PLAN OF CARE
Problem: Pain:  Goal: Control of acute pain  Control of acute pain   Outcome: Ongoing  Patient states understanding of pain scale and interventions. Pain assessed with hourly rounding and PRN. Patient states pain level is 10 out of 10 in left ankle. Leg elevated. Pain medications given as prescribed. Will continue to monitor.

## 2017-09-29 NOTE — FLOWSHEET NOTE
had a short visit with patient who was sleeping.  offered up a silent prayer. Patient did not respond.      09/29/17 1516   Encounter Summary   Services provided to: Patient   Referral/Consult From: Felipa   Continue Visiting (9/29/17)   Complexity of Encounter Low   Length of Encounter 15 minutes   Spiritual Assessment Completed Yes   Routine   Type Initial   Assessment Sleeping   Intervention Prayer   Outcome Did not respond

## 2017-09-29 NOTE — PROGRESS NOTES
Physical Therapy    Facility/Department: [facility] PROGRESSIVE CARE  Initial Assessment    NAME: Buster Chatman  : 1980  MRN: [id number]    Date of Service: 2017  H&P: Buster Chatman is a 40 y.o. male who presents to the hospital complaining of left ankle pain. Patient states on Monday of this week he was mowing the lawn and a piece of debris flew off the mower and hit his left ankle. He states yesterday the ankle started to form an abscess and become pain. This prompted him to come to the ER today. He reports a history of IVDA, Hep C, bipolar, but states he has not seen a doctor and is off of medication. Denies n/v/f/c. Patient      has a past medical history of Anxiety; epilepsy; Headache; hepatitis c; PTSD (post-traumatic stress disorder); and Transverse myelitis ([facility] has a past surgical history that includes eye surgery; Dental surgery; and Arm Surgery (Right). Restrictions  Restrictions/Precautions  Restrictions/Precautions: Weight Bearing, Contact Precautions  Required Braces or Orthoses?: No  Lower Extremity Weight Bearing Restrictions  Left Lower Extremity Weight Bearing: Non Weight Bearing  Position Activity Restriction  Other position/activity restrictions: Up as tolerated  Vision/Hearing  Vision: Within Functional Limits  Hearing: Within functional limits     Subjective  General  Chart Reviewed: Yes  Patient assessed for rehabilitation services?: Yes  Additional Pertinent Hx: Anxiety, PTSD, Transverse Myelitis  Response To Previous Treatment: Not applicable  Family / Caregiver Present: No  Diagnosis: Abscess left ankle  Follows Commands: Within Functional Limits  General Comment  Comments: Jennifer Sandoval RN reports patient appropriate for PT.   Subjective  Subjective: Patient agreeable to PT  Pain Screening  Patient Currently in Pain: Yes  Pain Assessment  Pain Assessment: 0-10  Pain Level: 3  Pain Location: Ankle  Pain Orientation: Left  Pain Intervention(s): Repositioned  Response to Pain Intervention: 25/28  Functional Limitation: Mobility: Walking and moving around  Mobility: Walking and Moving Around Current Status (): At least 1 percent but less than 20 percent impaired, limited or restricted  Mobility: Walking and Moving Around Goal Status (): At least 1 percent but less than 20 percent impaired, limited or restricted  OutComes Score                                           Goals  Short term goals  Time Frame for Short term goals: 3 visits  Short term goal 1: Patient will amb 300 feet indep with crutches and maintaining NWB LLE. Short term goal 2: Patient will negotiate 20 steps with crutches and hand rail indep.   Patient Goals   Patient goals : pain control       Therapy Time   Individual Concurrent Group Co-treatment   Time In 0859         Time Out 0938         Minutes [facility] Jaspreet Mike [place]

## 2017-09-29 NOTE — CONSULTS
(1.753 m)  Wt 179 lb 3.2 oz (81.3 kg)  SpO2 95%  BMI 26.46 kg/m2    Temperature Range: Temp: 99.5 °F (37.5 °C) Temp  Av.3 °F (37.4 °C)  Min: 98.2 °F (36.8 °C)  Max: 100.1 °F (37.8 °C)  General Appearance: Awake, alert, and in no apparent distress  Head: Normocephalic, without obvious abnormality, atraumatic  Eyes: Pupils equal, round, reactive, to light and accommodation; extraocular movements intact; sclera anicteric; conjunctivae pink  ENT: Oropharynx clear, without erythema, exudate, or thrush. Has multiple dental extractions  Neck: Supple, without lymphadenopathy. Pulmonary/Chest: Clear to auscultation, without wheezes, rales, or rhonchi  Cardiovascular: Regular rate and rhythm without murmurs, rubs, or gallops. Abdomen: Soft, nontender, nondistended. Extremities: No cyanosis, clubbing, or effusions. There is a left anterior leg/ankle area soft tissue abscess of which the wound is packed. There is surrounding cellulitic skin changes. There is edema in the left lower extremity. Neurologic: No gross sensory or motor deficits. Skin: Warm and dry with no rash. Medical Decision Making:   I have independently reviewed/ordered the following labs:  CBC with Differential:   Recent Labs      17   1504  17   1045   WBC  13.3*  13.5*   HGB  15.0  15.1   HCT  45.4  44.4   PLT  278  267   LYMPHOPCT  15  19   MONOPCT  7  9     BMP:   Recent Labs      17   1504  17   1045   NA  140  135   K  3.8  3.7   CL  101  98   CO2  25  21   BUN  6  6   CREATININE  0.73  0.81     Hepatic Function Panel: No results for input(s): PROT, LABALBU, BILIDIR, IBILI, BILITOT, ALKPHOS, ALT, AST in the last 72 hours. Lab Results   Component Value Date    CRP 32.5 (H) 2017     Lab Results   Component Value Date    SEDRATE 4 2017       Imaging Studies:   3 VIEWS OF THE LEFT ANKLE 2017 1:01 pm  Impression   Medial ankle soft tissue swelling without acute fracture.        Cultures:

## 2017-09-29 NOTE — PROGRESS NOTES
Pharmacy Note  Vancomycin Consult    Dyllan Arechiga is a 40 y.o. male started on Vancomycin for ankle wound infection ; consult received from Dr. Rocael Moody DPM to manage therapy. Also receiving the following antibiotics: Zosyn. Patient Active Problem List   Diagnosis    RML pneumonia    PTSD (post-traumatic stress disorder)    Chronic pain       Allergies:  Review of patient's allergies indicates no known allergies. Temp max: 100.1    Recent Labs      09/28/17   1504   BUN  6       Recent Labs      09/28/17   1504   CREATININE  0.73       Recent Labs      09/28/17   1504   WBC  13.3*       No intake or output data in the 24 hours ending 09/28/17 2229    Culture Date      Source                       Results  pending    Ht Readings from Last 1 Encounters:   09/28/17 5' 9\" (1.753 m)        Wt Readings from Last 1 Encounters:   09/28/17 179 lb 3.2 oz (81.3 kg)         Body mass index is 26.46 kg/(m^2). Estimated Creatinine Clearance: 139 mL/min (based on Cr of 0.73). Assessment/Plan:  Will initiate vancomycin 1250 mg IV every 12 hours. Timing of trough level will be determined based on culture results, renal function, and clinical response. Thank you for the consult. Will continue to follow.

## 2017-09-29 NOTE — PROGRESS NOTES
Occupational Therapy   Occupational Therapy Initial Assessment  Date: 2017   Patient Name: Azar Peraza  MRN: [id number]     : 1980    Date of Service: 2017  H&P: Arlin Kiser a 40 y. o. male who presents to the hospital complaining of left ankle pain. Patient states on Monday of this week he was mowing the lawn and a piece of debris flew off the mower and hit his left ankle. He states yesterday the ankle started to form an abscess and become pain. This prompted him to come to the ER today. He reports a history of IVDA, Hep C, bipolar, but states he has not seen a doctor and is off of medication. Denies n/v/f/c. Patient Diagnosis(es): There were no encounter diagnoses. has a past medical history of Anxiety; epilepsy; Headache; hepatitis c; PTSD (post-traumatic stress disorder); and Transverse myelitis ([facility] has a past surgical history that includes eye surgery; Dental surgery; and Arm Surgery (Right).       Restrictions  Restrictions/Precautions  Restrictions/Precautions: Weight Bearing, Contact Precautions  Required Braces or Orthoses?: No  Lower Extremity Weight Bearing Restrictions  Left Lower Extremity Weight Bearing: Non Weight Bearing  Position Activity Restriction  Other position/activity restrictions: Up as tolerated    Subjective   General  Patient assessed for rehabilitation services?: Yes  Pain Assessment  Patient Currently in Pain: Yes  Pain Assessment: 0-10  Pain Level: 3  Pain Type: Acute pain  Pain Location: Ankle  Pain Orientation: Left  Pain Descriptors: Aching  Pain Frequency: Continuous  Pain Intervention(s): Repositioned  Response to Pain Intervention: Patient Satisfied  Oxygen Therapy  SpO2: 95 %  O2 Device: None (Room air)    Social/Functional History  Social/Functional History  Lives With: Friend(s) (Roommate)  Type of Home: Apartment  Home Layout: Two level  Home Access: Stairs to enter with rails  Entrance Stairs - Number of Steps: 20 steps  Entrance Stairs - Concurrent Group Co-treatment   Time In 7295         Time Out 0931         Minutes 21          See above for LOF. RN reports patient is medically stable for therapy treatment this date. Chart reviewed prior to treatment and patient is agreeable for therapy. All lines intact and patient positioned comfortably at end of treatment. All patient needs addressed prior to ending therapy session.        Nisha Chowdhury OTR/L

## 2017-09-29 NOTE — PROGRESS NOTES
HPI:   Evaluated at bedside with Dr. Rasheed Benitez. Patient states that his pain is well controlled. He states that the ankle looks much better. No new complaints. Denies n/v/f/c. Vitals:    09/29/17 0455   BP: 115/68   Pulse: 88   Resp: 18   Temp: 99.1 °F (37.3 °C)   SpO2: 95%     General: AAO x 3 in NAD. Heart: RRR. Lungs: CTA b/l. Abd: Soft NT/ND  Vascular: DP/PT pulses are palpable 2/4, Bilateral.  CFT <3 seconds to all digits. Nonpitting edema present, Left medial ankle  Neurological: Protective sensation intact to 10/10 sites as tested with Bernard-Isaias Monofilament 5.07, Bilateral.  Sharp/dull discrimination intact,Bilateral .  Musculoskeletal: Muscle strength 5/5 for all lower extremity muscle groups, Bilateral.  Pain present upon palpation of medial left ankle. Pt able to perform active/passive ROM of L ankle without limitation. Dermatologic: Fluctuant abscess over medial malleolus of left ankle with erythema and calor. No crepitus. No proximal streaking. Small incision from I&D remains open. Roughly 3cc purulent drainage expressed from wound. Labs:  Lab Results   Component Value Date    WBC 13.3 (H) 09/28/2017    HGB 15.0 09/28/2017    HCT 45.4 09/28/2017     09/28/2017    LYMPHOPCT 15 09/28/2017    MONOPCT 7 09/28/2017    BASOPCT 0 09/28/2017     Lab Results   Component Value Date     09/28/2017    K 3.8 09/28/2017     09/28/2017    CO2 25 09/28/2017    BUN 6 09/28/2017    CREATININE 0.73 09/28/2017    GLUCOSE 100 (H) 09/28/2017    CALCIUM 8.7 09/28/2017     ASSESSMENT:   Patient is a 40 y.o. male with abscess of Left ankle    PLAN:   -Patient examined and evaluated. -Diagnosis and treatment options discussed in detail.  -Irrigated wound with 200cc of betadine saline mix. Only clean bloody drainage now.   -1/4 inch iodoform and DSD To Left ankle  -Reviewed imaging and labs  -IV abx currently vanc (pharmacy to dose) and zosyn.  Will follow up with ID.  -NWB LLE, up as

## 2017-09-29 NOTE — PROGRESS NOTES
Pharmacy Accuracy Service Medication History Note    The patient's list of current home medications has been reviewed. The patient's allergy list has been reviewed and updated. Source(s) of information: [employer] on [place]    Based on information provided by the above source(s), I have updated the patient's home med list as described below. I changed or updated the following medications on the patient's home medication list:  Added · Lamotrigine 25 mg tablet - pt takes 2 tablet BID. Last filled 6/20/17 for a 30 day supply  · Clonazepam 1mg tablet BID. Last filled 5/23/17 for a 30 day supply  · Quetiapine 50 mg nightly. Last filled 5/23/17 for a 30 day supply     Other Notes · Pt has been noncompliant with medications          Please feel free to call me with any questions about this encounter. Thank you.     Hortencia Oakes PharmD  Pharmacy Medication Accuracy Review Service  Phone:  [phone]  Fax: [phone]      Electronically signed by Hortencia Oakes, [address] [place] [address] on 9/29/2017 at 2:08 PM

## 2017-09-30 ENCOUNTER — APPOINTMENT (OUTPATIENT)
Dept: MRI IMAGING | Age: 37
DRG: 383 | End: 2017-09-30
Payer: MEDICARE

## 2017-09-30 VITALS
HEIGHT: 69 IN | WEIGHT: 179.2 LBS | DIASTOLIC BLOOD PRESSURE: 71 MMHG | SYSTOLIC BLOOD PRESSURE: 120 MMHG | HEART RATE: 71 BPM | RESPIRATION RATE: 20 BRPM | TEMPERATURE: 98 F | OXYGEN SATURATION: 97 % | BODY MASS INDEX: 26.54 KG/M2

## 2017-09-30 LAB
VANCOMYCIN TROUGH DATE LAST DOSE: ABNORMAL
VANCOMYCIN TROUGH DOSE AMOUNT: ABNORMAL
VANCOMYCIN TROUGH TIME LAST DOSE: ABNORMAL
VANCOMYCIN TROUGH: 8 UG/ML (ref 10–20)

## 2017-09-30 PROCEDURE — 36415 COLL VENOUS BLD VENIPUNCTURE: CPT

## 2017-09-30 PROCEDURE — A9579 GAD-BASE MR CONTRAST NOS,1ML: HCPCS | Performed by: PODIATRIST

## 2017-09-30 PROCEDURE — 73723 MRI JOINT LWR EXTR W/O&W/DYE: CPT

## 2017-09-30 PROCEDURE — 6360000002 HC RX W HCPCS: Performed by: PODIATRIST

## 2017-09-30 PROCEDURE — 6370000000 HC RX 637 (ALT 250 FOR IP): Performed by: INTERNAL MEDICINE

## 2017-09-30 PROCEDURE — 6370000000 HC RX 637 (ALT 250 FOR IP): Performed by: PODIATRIST

## 2017-09-30 PROCEDURE — 6360000004 HC RX CONTRAST MEDICATION: Performed by: PODIATRIST

## 2017-09-30 PROCEDURE — G0008 ADMIN INFLUENZA VIRUS VAC: HCPCS | Performed by: PODIATRIST

## 2017-09-30 PROCEDURE — 2500000003 HC RX 250 WO HCPCS: Performed by: PODIATRIST

## 2017-09-30 PROCEDURE — 90686 IIV4 VACC NO PRSV 0.5 ML IM: CPT | Performed by: PODIATRIST

## 2017-09-30 PROCEDURE — 80202 ASSAY OF VANCOMYCIN: CPT

## 2017-09-30 PROCEDURE — 2580000003 HC RX 258: Performed by: PODIATRIST

## 2017-09-30 PROCEDURE — 99232 SBSQ HOSP IP/OBS MODERATE 35: CPT | Performed by: INTERNAL MEDICINE

## 2017-09-30 RX ORDER — QUETIAPINE FUMARATE 50 MG/1
50 TABLET, FILM COATED ORAL NIGHTLY
Qty: 30 TABLET | Refills: 0 | Status: SHIPPED | OUTPATIENT
Start: 2017-09-30 | End: 2017-10-26 | Stop reason: DRUGHIGH

## 2017-09-30 RX ORDER — SODIUM CHLORIDE 0.9 % (FLUSH) 0.9 %
10 SYRINGE (ML) INJECTION
Status: COMPLETED | OUTPATIENT
Start: 2017-09-30 | End: 2017-09-30

## 2017-09-30 RX ORDER — HYDROCODONE BITARTRATE AND ACETAMINOPHEN 5; 325 MG/1; MG/1
1 TABLET ORAL EVERY 6 HOURS PRN
Qty: 30 TABLET | Refills: 0 | Status: SHIPPED | OUTPATIENT
Start: 2017-09-30 | End: 2017-10-07

## 2017-09-30 RX ORDER — SULFAMETHOXAZOLE AND TRIMETHOPRIM 800; 160 MG/1; MG/1
1 TABLET ORAL 2 TIMES DAILY
Qty: 20 TABLET | Refills: 0 | Status: SHIPPED | OUTPATIENT
Start: 2017-09-30 | End: 2017-10-10

## 2017-09-30 RX ORDER — HYDROCODONE BITARTRATE AND ACETAMINOPHEN 5; 325 MG/1; MG/1
1 TABLET ORAL EVERY 6 HOURS PRN
Qty: 20 TABLET | Refills: 0 | Status: SHIPPED | OUTPATIENT
Start: 2017-09-30 | End: 2017-10-07

## 2017-09-30 RX ORDER — CLONAZEPAM 1 MG/1
1 TABLET ORAL 2 TIMES DAILY
Qty: 60 TABLET | Refills: 0 | Status: SHIPPED | OUTPATIENT
Start: 2017-09-30 | End: 2017-10-26 | Stop reason: SDUPTHER

## 2017-09-30 RX ORDER — LAMOTRIGINE 25 MG/1
50 TABLET ORAL 2 TIMES DAILY
Qty: 120 TABLET | Refills: 0 | Status: SHIPPED | OUTPATIENT
Start: 2017-09-30 | End: 2017-10-26 | Stop reason: SDUPTHER

## 2017-09-30 RX ADMIN — TAZOBACTAM SODIUM AND PIPERACILLIN SODIUM 3.38 G: 375; 3 INJECTION, SOLUTION INTRAVENOUS at 09:32

## 2017-09-30 RX ADMIN — MORPHINE SULFATE 1 MG: 2 INJECTION, SOLUTION INTRAMUSCULAR; INTRAVENOUS at 00:08

## 2017-09-30 RX ADMIN — HYDROCODONE BITARTRATE AND ACETAMINOPHEN 2 TABLET: 5; 325 TABLET ORAL at 11:06

## 2017-09-30 RX ADMIN — HYDROCODONE BITARTRATE AND ACETAMINOPHEN 2 TABLET: 5; 325 TABLET ORAL at 15:32

## 2017-09-30 RX ADMIN — TAZOBACTAM SODIUM AND PIPERACILLIN SODIUM 3.38 G: 375; 3 INJECTION, SOLUTION INTRAVENOUS at 01:54

## 2017-09-30 RX ADMIN — GADOPENTETATE DIMEGLUMINE 17 ML: 469.01 INJECTION INTRAVENOUS at 13:13

## 2017-09-30 RX ADMIN — CLONAZEPAM 1 MG: 0.5 TABLET ORAL at 09:29

## 2017-09-30 RX ADMIN — INFLUENZA A VIRUS A/SINGAPORE/GP1908/2015 IVR-180A (H1N1) ANTIGEN (PROPIOLACTONE INACTIVATED), INFLUENZA A VIRUS A/HONG KONG/4801/2014 X-263B (H3N2) ANTIGEN (PROPIOLACTONE INACTIVATED), INFLUENZA B VIRUS B/BRISBANE/46/2015 ANTIGEN (PROPIOLACTONE INACTIVATED), AND INFLUENZA B VIRUS B/PHUKET/3073/2013 BVR-1B ANTIGEN (PROPIOLACTONE INACTIVATED) 0.5 ML: 15; 15; 15; 15 INJECTION, SUSPENSION INTRAMUSCULAR at 17:01

## 2017-09-30 RX ADMIN — MORPHINE SULFATE 2 MG: 2 INJECTION, SOLUTION INTRAMUSCULAR; INTRAVENOUS at 09:22

## 2017-09-30 RX ADMIN — ENOXAPARIN SODIUM 40 MG: 40 INJECTION SUBCUTANEOUS at 09:29

## 2017-09-30 RX ADMIN — LAMOTRIGINE 50 MG: 25 TABLET ORAL at 09:29

## 2017-09-30 RX ADMIN — Medication 10 ML: at 13:14

## 2017-09-30 RX ADMIN — VANCOMYCIN HYDROCHLORIDE 1500 MG: 1 INJECTION, POWDER, LYOPHILIZED, FOR SOLUTION INTRAVENOUS at 14:20

## 2017-09-30 ASSESSMENT — PAIN SCALES - GENERAL
PAINLEVEL_OUTOF10: 5
PAINLEVEL_OUTOF10: 4
PAINLEVEL_OUTOF10: 7
PAINLEVEL_OUTOF10: 6

## 2017-09-30 NOTE — DISCHARGE INSTR - DIET

## 2017-09-30 NOTE — PLAN OF CARE
Problem: Falls - Risk of  Goal: Absence of falls  Outcome: Met This Shift  Patient has remained free from falls this shift. Patient is alert and oriented times four. Bed to lowest position with door open. Patient care items and call light in reach. Patient uses call light appropriately for assist. Will continue to monitor. Please see fall assessment.          Comments:   careplan reviewed with pt and family

## 2017-09-30 NOTE — PROGRESS NOTES
SpO2 97%  BMI 26.46 kg/m2    Temperature Range: Temp: 98.1 °F (36.7 °C) Temp  Av.1 °F (36.7 °C)  Min: 97.5 °F (36.4 °C)  Max: 98.8 °F (37.1 °C)    Physical Exam   Constitutional: He is oriented to person, place, and time. HENT:   Head: Normocephalic and atraumatic. Eyes: Pupils are equal, round, and reactive to light. Neck: Normal range of motion. Cardiovascular: Regular rhythm and normal heart sounds. Pulmonary/Chest: Effort normal.   Abdominal: Soft. Bowel sounds are normal.   Neurological: He is alert and oriented to person, place, and time. Skin:   Dressing not removed       Laboratory data:   I have independently reviewed the following labs:  CBC with Differential:   Recent Labs      17   1504  17   1045   WBC  13.3*  13.5*   HGB  15.0  15.1   HCT  45.4  44.4   PLT  278  267   LYMPHOPCT  15  19   MONOPCT  7  9     BMP:   Recent Labs      17   1504  17   1045   NA  140  135   K  3.8  3.7   CL  101  98   CO2  25  21   BUN  6  6   CREATININE  0.73  0.81     Hepatic Function Panel: No results for input(s): PROT, LABALBU, BILIDIR, IBILI, BILITOT, ALKPHOS, ALT, AST in the last 72 hours. No results for input(s): VANCOTROUGH in the last 72 hours. Lab Results   Component Value Date    CRP 32.5 (H) 2017     Lab Results   Component Value Date    SEDRATE 4 2017       Imaging Studies:   No new imaging    Cultures:      Cult,Aerobe/Anaerobe [[id number]] (Abnormal) Collected: 17 5189     Order Status: Completed Specimen: Foot Updated: 17      Specimen Description . FOOT LEFT ANKLE Performed at [address] [place], [place] [id number] [facility]      Specimen Description [place], [address] [facility] [phone]      Special Requests NOT REPORTED      Direct Exam NO NEUTROPHILS SEEN      Direct Exam RARE GRAM POSITIVE COCCI SINGLY (A)      Culture STAPHYLOCOCCUS AUREUS LIGHT GROWTH (A)      Culture Performed at [facility] [address] [facility], [address] [place] [address] [phone]      Status Pending           Medications:      QUEtiapine  50 mg Oral Nightly    lamoTRIgine  50 mg Oral BID    clonazePAM  1 mg Oral BID    sodium chloride flush  10 mL Intravenous 2 times per day    enoxaparin  40 mg Subcutaneous Daily    piperacillin-tazobactam  3.375 g Intravenous Q8H    influenza virus vaccine  0.5 mL Intramuscular Once    vancomycin  1,250 mg Intravenous Q12H    vancomycin (VANCOCIN) intermittent dosing (placeholder)   Other RX Placeholder     Thank you for allowing us to participate in the care of this patient. Please call with questions.     Abdulkadir Vaughn MD  [employer] messaging: [phone]

## 2017-09-30 NOTE — CARE COORDINATION
Case Management Initial Discharge Plan  [facility],         Readmission Risk              Readmission Risk:        2.5       Age 72 or Greater:  0    Admitted from SNF or Requires Paid or Family Care:  0    Currently has CHF,COPD,ARF,CRI,or is on dialysis:  0    Takes more than 5 Prescription Medications:  0    Takes Digoxin,Insulin,Anticoagulants,Narcotics or ASA/Plavix:  [address] in Past 12 Months:  0    On Disability:  0    Patient Considers own Health:  2.5            Met with:patient to discuss discharge plans. Information verified: address, contacts, phone number, , insurance Yes  PCP: No primary care provider on file. Date of last visit:  na    Insurance Provider: [employer]    Discharge Planning  Current Residence:   apt  Living Arrangements:  Family Members   Home has 1 stories/2 stairs to climb  Support Systems:  Family Members  Current Services PTA:    Supplier:  none  Patient able to perform ADL's:Independent  DME used to aid ambulation prior to admission:  none/during admission crutches    Potential Assistance Needed:  N/A    Pharmacy:  [facility] on [address] [place] [address] Medications:  No  Does patient want to participate in local refill/ meds to beds program?  No    Patient agreeable to home care: No  [place] of choice provided:  n/a      Type of Home Care Services:  None  Patient expects to be discharged to:  home    Prior SNF/Rehab Placement and Facility:   none  Agreeable to SNF/Rehab: No  [place] of choice provided: n/a   Evaluation: no    Expected Discharge date:  17  Follow Up Appointment: Best Day/ Time: Monday AM    Transportation provider:  friend  Transportation arrangements needed for discharge: No    Discharge Plan:  Met with pt at bedside. Pt lives with friend/roommate in apt. Friend is flip to assist pt. Pt si NWB LLE and will be discharged home with crutches. Pt given list of PCP.   Pt see psychiatrist after witnessing his friend's murder. Pt has information for counseling at [facility]. Pt will dc home. No needs identified.          Electronically signed by Alfred Painter RN on 9/30/17 at 10:51 AM

## 2017-09-30 NOTE — PROGRESS NOTES
HPI:   Evaluated at bedside. Patient states that his pain is well controlled with norco currently. Denies n/v/f/c. States that swelling is down and he feels better. Vitals:    09/30/17 0512   BP: 106/69   Pulse: 73   Resp: 18   Temp: 97.5 °F (36.4 °C)   SpO2: 93%     General: AAO x 3 in NAD. Heart: RRR. Lungs: CTA b/l. Abd: Soft NT/ND  Vascular: DP/PT pulses are palpable 2/4, Bilateral.  CFT <3 seconds to all digits. Mild nonpitting edema present, Left medial ankle  Neurological: Protective sensation intact to 10/10 sites as tested with [place] Monofilament 5.07, Bilateral.  Sharp/dull discrimination intact,Bilateral .  Musculoskeletal: Muscle strength 5/5 for all lower extremity muscle groups, Bilateral.  Pain present upon palpation of medial left ankle. Pt able to perform active/passive ROM of L ankle without limitation. Dermatologic: Open wound over anterior medial left ankle with improving periwound erythema and calor. No crepitus. No proximal streaking. Roughly 0.5cc purulent drainage expressed from wound. Does not probe to bone. Labs:  Lab Results   Component Value Date    WBC 13.5 (H) 09/29/2017    HGB 15.1 09/29/2017    HCT 44.4 09/29/2017     09/29/2017    LYMPHOPCT 19 09/29/2017    MONOPCT 9 09/29/2017    BASOPCT 1 09/29/2017     Lab Results   Component Value Date     09/29/2017    K 3.7 09/29/2017    CL 98 09/29/2017    CO2 21 09/29/2017    BUN 6 09/29/2017    CREATININE 0.81 09/29/2017    GLUCOSE 112 (H) 09/29/2017    CALCIUM 8.7 09/29/2017     ASSESSMENT:   Patient is a 40 y.o. male with abscess of Left ankle    PLAN:   -Patient examined and evaluated. -Diagnosis and treatment options discussed in detail.  -Irrigated wound with 300cc of betadine saline mix. Only clean bloody drainage now.   -1/4 inch iodoform and DSD To Left ankle  -Reviewed imaging and labs  -IV abx currently vanc (pharmacy to dose) and zosyn.  Will follow up with ID.  -NWB LLE, up as

## 2017-09-30 NOTE — PROGRESS NOTES
[id number] [facility]    Progress Note    9/30/2017    10:09 AM    Name:   Ernst Steele  MRN:     [id number]     Lisbethezekielide:      [de-identified]   Room:   [address] [place] [address] Day:  2  Admit Date:  9/28/2017 12:15 PM    PCP:   No primary care provider on file. Code Status:  Full Code    Subjective:     C/C:   Chief Complaint   Patient presents with    Abscess     left ankle, onset Last Monday, hit stones while mowing grass     Interval History Status: improved. He states the ankle pain is improved today  Denies other complaints  Is appreciative of having his medicines restarted    Brief History:     Latonia Manzo a 40 y. o. male who presents to the hospital complaining of left ankle pain. Patient states on Monday of this week he was mowing the lawn and a piece of debris flew off the mower and hit his left ankle. He states yesterday the ankle started to form an abscess and become pain. This prompted him to come to the ER today. He reports a history of IVDA, Hep C, bipolar, seizures but states he has not seen a doctor since his retired and is off of medication. Was taking lamictal, seroquel and klonopin for above conditions    Review of Systems:     Constitutional:  negative for chills, fevers, sweats  Respiratory:  negative for cough, dyspnea on exertion, shortness of breath, wheezing  Cardiovascular:  negative for chest pain, chest pressure/discomfort, lower extremity edema, palpitations  Gastrointestinal:  negative for abdominal pain, constipation, diarrhea, nausea, vomiting  Neurological:  negative for dizziness, headache    Medications:      Allergies:  No Known Allergies    Current Meds:   Scheduled Meds:    QUEtiapine  50 mg Oral Nightly    lamoTRIgine  50 mg Oral BID    clonazePAM  1 mg Oral BID    sodium chloride flush  10 mL Intravenous 2 times per day    enoxaparin  40 mg Subcutaneous Daily    piperacillin-tazobactam  3.375 g Intravenous Q8H    influenza virus vaccine  0.5 mL Intramuscular Once    vancomycin  1,250 mg Intravenous Q12H    vancomycin (VANCOCIN) intermittent dosing (placeholder)   Other RX Placeholder     Continuous Infusions:    PRN Meds: albuterol sulfate HFA, sodium chloride flush, acetaminophen, magnesium hydroxide, HYDROcodone 5 mg - acetaminophen **OR** HYDROcodone 5 mg - acetaminophen, morphine **OR** morphine    Data:     Past Medical History:   has a past medical history of Anxiety; epilepsy; Headache; hepatitis c; PTSD (post-traumatic stress disorder); and Transverse myelitis ([facility] [address] Social History:   reports that he has been smoking Cigarettes. He has been smoking about 0.50 packs per day. He has never used smokeless tobacco. He reports that he drinks alcohol. He reports that he does not use illicit drugs. Family History:   Family History   Problem Relation Age of Onset    Cancer Maternal Grandmother        Vitals:  /68  Pulse 68  Temp 98.1 °F (36.7 °C) (Oral)   Resp 20  Ht 5' 9\" (1.753 m)  Wt 179 lb 3.2 oz (81.3 kg)  SpO2 97%  BMI 26.46 kg/m2  Temp (24hrs), Av.1 °F (36.7 °C), Min:97.5 °F (36.4 °C), Max:98.8 °F (37.1 °C)    No results for input(s): POCGLU in the last 72 hours. I/O (24Hr):     Intake/Output Summary (Last 24 hours) at 17 1009  Last data filed at 17 2122   Gross per 24 hour   Intake                0 ml   Output              500 ml   Net             -500 ml       Labs:    Hematology:  Recent Labs      17   1504  17   1045   WBC  13.3*  13.5*   RBC  4.86  4.77   HGB  15.0  15.1   HCT  45.4  44.4   MCV  93.5  93.1   MCH  30.9  31.6   MCHC  33.1  34.0   RDW  13.2  13.2   PLT  278  267   MPV  8.0  7.8   SEDRATE  4   --    CRP  32.5*   --      Chemistry:  Recent Labs      17   1504  17   1045   NA  140  135   K  3.8  3.7   CL  101  98   CO2  25  21   GLUCOSE  100*  112*   BUN  6  6   CREATININE  0.73  0.81   ANIONGAP  14  16   LABGLOM  >60  >60   GFRAA  >60  >60 CALCIUM  8.7  8.7     No results for input(s): PROT, LABALBU, LABA1C, Z0KVAUR, A7ESPQA, FT4, TSH, AST, ALT, LDH, GGT, ALKPHOS, LABGGT, BILITOT, BILIDIR, AMMONIA, AMYLASE, LIPASE, LACTATE, CHOL, HDL, LDLCHOLESTEROL, CHOLHDLRATIO, TRIG, VLDL, GPT29XR, PHENYTOIN, PHENYF, URICACID, POCGLU in the last 72 hours. Lab Results   Component Value Date/Time    SPECIAL NOT REPORTED 09/28/2017 03:39 PM     Lab Results   Component Value Date/Time    CULTURE STAPHYLOCOCCUS AUREUS LIGHT GROWTH (A) 09/28/2017 03:39 PM    CULTURE  09/28/2017 03:39 PM     Performed at [address] [place] [address], [address] [place] [address] [phone]       No results found for: POCPH, PHART, PH, POCPCO2, HYZ2LZB, PCO2, POCPO2, PO2ART, PO2, POCHCO3, AKH6PUU, HCO3, NBEA, PBEA, BEART, BE, THGBART, THB, FJE9CYQ, PSAN0DIR, M9BDPEZO, O2SAT, FIO2    Radiology:    Nothing new    Physical Examination:        General appearance:  alert, cooperative and no distress  Mental Status:  oriented to person, place and time and normal affect  Lungs:  clear to auscultation bilaterally, normal effort  Heart:  regular rate and rhythm, no murmur  Abdomen:  soft, nontender, nondistended, normal bowel sounds, no masses, hepatomegaly, splenomegaly  Extremities:  no redness, tenderness in the calves; left ankle bandaged  Skin:  no rashes, induration    Assessment:        Primary Problem  Abscess of left leg    Active Hospital Problems    Diagnosis Date Noted    Idiopathic generalized epilepsy ([facility] [address] [G40.309] 09/29/2017    Tobacco abuse [Z72.0] 09/29/2017    Abscess of left leg [L02.416] 09/29/2017    PTSD (post-traumatic stress disorder) [F43.10]        Plan:        1. Scripts left for lamictal, seroquel, klonopin  2. Ok to [employer] home per IM at discretion of primary  3.  F/u pcp, psychiatry    Robert Gibbons,   9/30/2017  10:09 AM

## 2017-10-03 LAB
CULTURE: ABNORMAL
DIRECT EXAM: ABNORMAL
DIRECT EXAM: ABNORMAL
Lab: ABNORMAL
ORGANISM: ABNORMAL
SPECIMEN DESCRIPTION: ABNORMAL
SPECIMEN DESCRIPTION: ABNORMAL
STATUS: ABNORMAL

## 2017-10-26 ENCOUNTER — HOSPITAL ENCOUNTER (OUTPATIENT)
Age: 37
Setting detail: SPECIMEN
Discharge: HOME | End: 2017-10-26
Payer: MEDICARE

## 2017-10-26 ENCOUNTER — OFFICE VISIT (OUTPATIENT)
Dept: FAMILY MEDICINE CLINIC | Age: 37
End: 2017-10-26
Payer: MEDICARE

## 2017-10-26 VITALS
HEIGHT: 70 IN | DIASTOLIC BLOOD PRESSURE: 82 MMHG | BODY MASS INDEX: 26.45 KG/M2 | HEART RATE: 86 BPM | WEIGHT: 184.8 LBS | OXYGEN SATURATION: 93 % | SYSTOLIC BLOOD PRESSURE: 118 MMHG | TEMPERATURE: 97.7 F

## 2017-10-26 DIAGNOSIS — Z00.00 HEALTH CARE MAINTENANCE: ICD-10-CM

## 2017-10-26 DIAGNOSIS — Z79.899 HIGH RISK MEDICATION USE: ICD-10-CM

## 2017-10-26 DIAGNOSIS — R73.9 ELEVATED BLOOD SUGAR: ICD-10-CM

## 2017-10-26 DIAGNOSIS — F41.1 GAD (GENERALIZED ANXIETY DISORDER): ICD-10-CM

## 2017-10-26 DIAGNOSIS — J45.20 MILD INTERMITTENT ASTHMA WITHOUT COMPLICATION: ICD-10-CM

## 2017-10-26 DIAGNOSIS — F19.90 IVDU (INTRAVENOUS DRUG USER): ICD-10-CM

## 2017-10-26 DIAGNOSIS — F43.10 PTSD (POST-TRAUMATIC STRESS DISORDER): Primary | ICD-10-CM

## 2017-10-26 DIAGNOSIS — Z72.0 TOBACCO ABUSE: ICD-10-CM

## 2017-10-26 DIAGNOSIS — R76.8 HEPATITIS C ANTIBODY TEST POSITIVE: ICD-10-CM

## 2017-10-26 PROCEDURE — G8484 FLU IMMUNIZE NO ADMIN: HCPCS | Performed by: INTERNAL MEDICINE

## 2017-10-26 PROCEDURE — G8419 CALC BMI OUT NRM PARAM NOF/U: HCPCS | Performed by: INTERNAL MEDICINE

## 2017-10-26 PROCEDURE — G8427 DOCREV CUR MEDS BY ELIG CLIN: HCPCS | Performed by: INTERNAL MEDICINE

## 2017-10-26 PROCEDURE — 99215 OFFICE O/P EST HI 40 MIN: CPT | Performed by: INTERNAL MEDICINE

## 2017-10-26 PROCEDURE — 4004F PT TOBACCO SCREEN RCVD TLK: CPT | Performed by: INTERNAL MEDICINE

## 2017-10-26 PROCEDURE — 1111F DSCHRG MED/CURRENT MED MERGE: CPT | Performed by: INTERNAL MEDICINE

## 2017-10-26 RX ORDER — ALBUTEROL SULFATE 90 UG/1
2 AEROSOL, METERED RESPIRATORY (INHALATION) EVERY 6 HOURS PRN
Qty: 1 INHALER | Refills: 0 | Status: SHIPPED | OUTPATIENT
Start: 2017-10-26

## 2017-10-26 RX ORDER — QUETIAPINE FUMARATE 100 MG/1
100 TABLET, FILM COATED ORAL NIGHTLY
COMMUNITY
End: 2017-10-26 | Stop reason: SDUPTHER

## 2017-10-26 RX ORDER — GABAPENTIN 300 MG/1
300 CAPSULE ORAL 3 TIMES DAILY
Qty: 90 CAPSULE | Refills: 3 | Status: SHIPPED | OUTPATIENT
Start: 2017-10-26

## 2017-10-26 RX ORDER — GABAPENTIN 300 MG/1
300 CAPSULE ORAL 3 TIMES DAILY
COMMUNITY
End: 2017-10-26 | Stop reason: SDUPTHER

## 2017-10-26 RX ORDER — LAMOTRIGINE 25 MG/1
50 TABLET ORAL 2 TIMES DAILY
Qty: 120 TABLET | Refills: 3 | Status: SHIPPED | OUTPATIENT
Start: 2017-10-26

## 2017-10-26 RX ORDER — CLONAZEPAM 1 MG/1
1 TABLET ORAL 2 TIMES DAILY
Qty: 60 TABLET | Refills: 0 | Status: SHIPPED | OUTPATIENT
Start: 2017-10-26

## 2017-10-26 RX ORDER — VARENICLINE TARTRATE 1 MG/1
1 TABLET, FILM COATED ORAL 2 TIMES DAILY
Qty: 60 TABLET | Refills: 3 | Status: SHIPPED | OUTPATIENT
Start: 2017-10-26

## 2017-10-26 RX ORDER — QUETIAPINE FUMARATE 100 MG/1
100 TABLET, FILM COATED ORAL NIGHTLY
Qty: 60 TABLET | Refills: 0 | Status: SHIPPED | OUTPATIENT
Start: 2017-10-26 | End: 2017-12-22 | Stop reason: SDUPTHER

## 2017-10-26 RX ORDER — VARENICLINE TARTRATE 25 MG
KIT ORAL
Qty: 42 TABLET | Refills: 0 | Status: SHIPPED | OUTPATIENT
Start: 2017-10-26

## 2017-10-26 ASSESSMENT — PATIENT HEALTH QUESTIONNAIRE - PHQ9
1. LITTLE INTEREST OR PLEASURE IN DOING THINGS: 1
2. FEELING DOWN, DEPRESSED OR HOPELESS: 1
SUM OF ALL RESPONSES TO PHQ QUESTIONS 1-9: 2
SUM OF ALL RESPONSES TO PHQ9 QUESTIONS 1 & 2: 2

## 2017-10-26 NOTE — PATIENT INSTRUCTIONS
Stopping Smoking: Care Instructions  Your Care Instructions  Cigarette smokers crave the nicotine in cigarettes. Giving it up is much harder than simply changing a habit. Your body has to stop craving the nicotine. It is hard to quit, but you can do it. There are many tools that people use to quit smoking. You may find that combining tools works best for you. There are several steps to quitting. First you get ready to quit. Then you get support to help you. After that, you learn new skills and behaviors to become a nonsmoker. For many people, a necessary step is getting and using medicine. Your doctor will help you set up the plan that best meets your needs. You may want to attend a smoking cessation program to help you quit smoking. When you choose a program, look for one that has proven success. Ask your doctor for ideas. You will greatly increase your chances of success if you take medicine as well as get counseling or join a cessation program.  Some of the changes you feel when you first quit tobacco are uncomfortable. Your body will miss the nicotine at first, and you may feel short-tempered and grumpy. You may have trouble sleeping or concentrating. Medicine can help you deal with these symptoms. You may struggle with changing your smoking habits and rituals. The last step is the tricky one: Be prepared for the smoking urge to continue for a time. This is a lot to deal with, but keep at it. You will feel better. Follow-up care is a key part of your treatment and safety. Be sure to make and go to all appointments, and call your doctor if you are having problems. Its also a good idea to know your test results and keep a list of the medicines you take. How can you care for yourself at home? · Ask your family, friends, and coworkers for support. You have a better chance of quitting if you have help and support.   · Join a support group, such as Nicotine Anonymous, for people who are trying to quit

## 2017-10-26 NOTE — PROGRESS NOTES
breath. Bowels have been normal without constipation or diarrhea         Objective:        Physical Exam:  /82 (Site: Left Arm, Position: Sitting, Cuff Size: Medium Adult)   Pulse 86   Temp 97.7 °F (36.5 °C) (Oral)   Ht 5' 9.6\" (1.768 m)   Wt 184 lb 12.8 oz (83.8 kg)   SpO2 93%   BMI 26.82 kg/m²     General: Alert and oriented, in no distress. Patient ambulating with normal gait. Normal body habitus. Chest: clear with no wheezes or rales. No retractions, or use of accessory muscles noted. Cardiovascular: PMI is not displaced, and no thrill noted. Regular rate and rhythm with no rub, murmur or gallop. There is no peripheral edema. Pedal pulses are normal.   Abdomen: Abdomen is soft and nontender. The bowel sounds are normal.   Musculoskeletal: There are no deformities of the the extremities. Patient has all ten fingers intact. The patient has full range of motion on all 4 extremities without pain. Skin: The skin is warm and dry. There is folliculitis of the beard noted     Prior to Visit Medications    Medication Sig Taking? Authorizing Provider   QUEtiapine (SEROQUEL) 100 MG tablet Take 100 mg by mouth nightly Yes Asher WILKINSON Blood, DO   clonazePAM (KLONOPIN) 1 MG tablet Take 1 tablet by mouth 2 times daily Yes Asher WILKINSON Blood, DO   lamoTRIgine (LAMICTAL) 25 MG tablet Take 2 tablets by mouth 2 times daily Yes Asher WILKINSON Blood, DO   gabapentin (NEURONTIN) 300 MG capsule Take 300 mg by mouth 3 times daily  Bertram Martinez MD   albuterol (PROAIR HFA) 108 (90 BASE) MCG/ACT inhaler Inhale 2 puffs into the lungs every 6 hours as needed for Wheezing. Jorge Rubin MD       Data Review hospital data reviewed in careEverywhere     Assessment/Plan:      1. PTSD (post-traumatic stress disorder)  - needs to establish care with [place] next week    2. Elevated blood sugar  - Hemoglobin A1C; Future    3.  JENNIFER (generalized anxiety disorder)  - needs to discuss management with psychaitry, due to

## 2017-10-29 LAB
6-ACETYLMORPHINE, UR: PRESENT
7-AMINOCLONAZEPAM, URINE: NOT DETECTED
ALPHA-OH-ALPRAZ, URINE: PRESENT
ALPRAZOLAM, URINE: PRESENT
AMPHETAMINES, URINE: NOT DETECTED
BARBITURATES, URINE: NOT DETECTED
BENZOYLECGONINE, UR: NOT DETECTED
BUPRENORPHINE URINE: NOT DETECTED
CARISOPRODOL, UR: NOT DETECTED
CLONAZEPAM, URINE: NOT DETECTED
CODEINE, URINE: PRESENT
CREATININE URINE: 60.9 MG/DL (ref 20–400)
DIAZEPAM, URINE: NOT DETECTED
EER PAIN MGT DRUG PANEL, HIGH RES/EMIT U: NORMAL
ETHYL GLUCURONIDE UR: NOT DETECTED
FENTANYL URINE: PRESENT
HYDROCODONE, URINE: NOT DETECTED
HYDROMORPHONE, URINE: NOT DETECTED
LORAZEPAM, URINE: NOT DETECTED
MARIJUANA METAB, UR: NOT DETECTED
MDA, UR: NOT DETECTED
MDEA, EVE, UR: NOT DETECTED
MDMA URINE: NOT DETECTED
MEPERIDINE METAB, UR: NOT DETECTED
METHADONE, URINE: NOT DETECTED
METHAMPHETAMINE, URINE: NOT DETECTED
METHYLPHENIDATE: NOT DETECTED
MIDAZOLAM, URINE: NOT DETECTED
MORPHINE URINE: PRESENT
NORBUPRENORPHINE, URINE: NOT DETECTED
NORDIAZEPAM, URINE: NOT DETECTED
NORFENTANYL, URINE: PRESENT
NORHYDROCODONE, URINE: NOT DETECTED
NOROXYCODONE, URINE: NOT DETECTED
NOROXYMORPHONE, URINE: NOT DETECTED
OXAZEPAM, URINE: NOT DETECTED
OXYCODONE URINE: NOT DETECTED
OXYMORPHONE, URINE: NOT DETECTED
PAIN MGT DRUG PANEL, HI RES, UR: NORMAL
PCP,URINE: NOT DETECTED
PHENTERMINE, UR: NOT DETECTED
PROPOXYPHENE, URINE: NOT DETECTED
TAPENTADOL, URINE: NOT DETECTED
TAPENTADOL-O-SULFATE, URINE: NOT DETECTED
TEMAZEPAM, URINE: NOT DETECTED
TRAMADOL, URINE: NOT DETECTED
ZOLPIDEM, URINE: NOT DETECTED

## 2017-12-22 RX ORDER — QUETIAPINE FUMARATE 100 MG/1
100 TABLET, FILM COATED ORAL NIGHTLY
Qty: 90 TABLET | Refills: 1 | Status: SHIPPED | OUTPATIENT
Start: 2017-12-22

## 2017-12-22 NOTE — TELEPHONE ENCOUNTER
Last visit 10/26/17  Next Visit Date:  No future appointments.     Health Maintenance   Topic Date Due    HIV screen  02/20/1995    DTaP/Tdap/Td vaccine (1 - Tdap) 02/20/1999    Pneumococcal med risk (1 of 1 - PPSV23) 02/20/1999    Flu vaccine  Completed       No results found for: LABA1C          ( goal A1C is < 7)   No results found for: LABMICR  No results found for: LDLCHOLESTEROL, LDLCALC    (goal LDL is <100)   AST (U/L)   Date Value   02/28/2015 14     ALT (U/L)   Date Value   02/28/2015 15     BUN (mg/dL)   Date Value   09/29/2017 6     BP Readings from Last 3 Encounters:   10/26/17 118/82   09/30/17 120/71   05/18/17 (!) 149/87          (goal 120/80)    All Future Testing planned in [facility]  Lab Frequency Next Occurrence   Hemoglobin A1C Once 01/11/2018   CBC With Auto Differential Once 01/11/2018   Comprehensive Metabolic Panel Once 07/01/3739   HIV Screen Once 01/11/2018   Hepatitis C RNA, Quantitative, PCR Once 01/11/2018               Patient Active Problem List:     RML pneumonia [facility])     PTSD (post-traumatic stress disorder)     Chronic pain     Idiopathic generalized epilepsy ([facility] [address]     Tobacco abuse     Abscess of left leg

## 2018-09-10 ENCOUNTER — TELEPHONE (OUTPATIENT)
Dept: FAMILY MEDICINE CLINIC | Age: 38
End: 2018-09-10